# Patient Record
Sex: FEMALE | Race: WHITE | Employment: FULL TIME | URBAN - METROPOLITAN AREA
[De-identification: names, ages, dates, MRNs, and addresses within clinical notes are randomized per-mention and may not be internally consistent; named-entity substitution may affect disease eponyms.]

---

## 2017-11-19 ENCOUNTER — HOSPITAL ENCOUNTER (OUTPATIENT)
Age: 57
Discharge: HOME | End: 2017-11-19
Attending: FAMILY MEDICINE
Payer: COMMERCIAL

## 2017-11-19 VITALS
WEIGHT: 185 LBS | HEART RATE: 105 BPM | TEMPERATURE: 99 F | SYSTOLIC BLOOD PRESSURE: 119 MMHG | OXYGEN SATURATION: 98 % | DIASTOLIC BLOOD PRESSURE: 75 MMHG | RESPIRATION RATE: 16 BRPM

## 2017-11-19 DIAGNOSIS — J01.00 ACUTE NON-RECURRENT MAXILLARY SINUSITIS: Primary | ICD-10-CM

## 2017-11-19 PROCEDURE — 99213 OFFICE O/P EST LOW 20 MIN: CPT

## 2017-11-19 PROCEDURE — 99202 OFFICE O/P NEW SF 15 MIN: CPT

## 2017-11-19 RX ORDER — AMOXICILLIN 875 MG/1
875 TABLET, COATED ORAL 2 TIMES DAILY
Qty: 20 TABLET | Refills: 0 | Status: SHIPPED | OUTPATIENT
Start: 2017-11-19 | End: 2017-11-29

## 2017-11-19 NOTE — ED PROVIDER NOTES
Patient presents with:  Cough/URI      HPI:     Rigo Oneil is a 59year old female who presents with for chief complaint of sinus congestion, sinus pain, purulent nasal secretions  X 1 week.     The patient denies complaints of fevers, chills, headache, lesions      Assessment/Plan:     Diagnosis:    ICD-10-CM    1. Acute non-recurrent maxillary sinusitis J01.00        See antibiotic below.   OTC Advil Cold and Sinus as needed symptoms      Orders Placed This Encounter      amoxicillin 875 MG Oral Tab

## 2018-01-22 ENCOUNTER — OFFICE VISIT (OUTPATIENT)
Dept: INTERNAL MEDICINE CLINIC | Facility: CLINIC | Age: 58
End: 2018-01-22

## 2018-01-22 VITALS
DIASTOLIC BLOOD PRESSURE: 78 MMHG | SYSTOLIC BLOOD PRESSURE: 122 MMHG | OXYGEN SATURATION: 96 % | TEMPERATURE: 99 F | WEIGHT: 195 LBS | BODY MASS INDEX: 32.49 KG/M2 | HEIGHT: 65 IN | HEART RATE: 111 BPM

## 2018-01-22 DIAGNOSIS — Z00.00 ANNUAL PHYSICAL EXAM: Primary | ICD-10-CM

## 2018-01-22 DIAGNOSIS — E66.3 OVERWEIGHT: ICD-10-CM

## 2018-01-22 DIAGNOSIS — R53.83 FATIGUE, UNSPECIFIED TYPE: ICD-10-CM

## 2018-01-22 DIAGNOSIS — Z12.31 ENCOUNTER FOR SCREENING MAMMOGRAM FOR BREAST CANCER: ICD-10-CM

## 2018-01-22 DIAGNOSIS — I83.90 VARICOSE VEIN OF LEG: ICD-10-CM

## 2018-01-22 PROCEDURE — 99386 PREV VISIT NEW AGE 40-64: CPT | Performed by: INTERNAL MEDICINE

## 2018-01-22 NOTE — PROGRESS NOTES
Mae Borja is a 59year old female. Patient presents with:  Consult: New Patient - Last pap 1 year ago  Physical: Annual       HPI:   Mae Borja is a 59year old female who presents for a complete physical exam.     PMH: none, overweight  Is planning Left arm, Patient Position: Sitting, Cuff Size: adult)   Pulse 111   Temp 98.9 °F (37.2 °C) (Oral)   Ht 5' 5\" (1.651 m)   Wt 195 lb (88.5 kg)   SpO2 96%   BMI 32.45 kg/m²     GENERAL: well developed, well nourished, in no apparent distress  HEENT: normal

## 2018-02-26 ENCOUNTER — TELEPHONE (OUTPATIENT)
Dept: INTERNAL MEDICINE CLINIC | Facility: CLINIC | Age: 58
End: 2018-02-26

## 2018-02-26 NOTE — TELEPHONE ENCOUNTER
Pt would like to have a prescription for cold sore. She noticed one coming up today.      Tasked high to Muzy

## 2018-03-01 RX ORDER — ACYCLOVIR 50 MG/G
1 OINTMENT TOPICAL 4 TIMES DAILY
Qty: 15 G | Refills: 0 | Status: SHIPPED | OUTPATIENT
Start: 2018-03-01 | End: 2018-03-06

## 2018-03-01 NOTE — TELEPHONE ENCOUNTER
Pt is requesting a cream for her cold sore, the pill helped but it is not gone  Pt is leaving town this weekend and would like it cleared up before then  Please send [address], please call pt [phone]    Tasked to nursing

## 2019-08-13 ENCOUNTER — TELEPHONE (OUTPATIENT)
Dept: INTERNAL MEDICINE CLINIC | Facility: CLINIC | Age: 59
End: 2019-08-13

## 2019-08-13 DIAGNOSIS — R73.01 ABNORMAL FASTING GLUCOSE: Primary | ICD-10-CM

## 2019-08-13 NOTE — TELEPHONE ENCOUNTER
Please notify patient that I received her lab results (these were faxed). Overall her kidney, liver and blood counts look good. Her cholesterol and fasting sugar are elevated. Her fasting sugar is in the diabetes range.  I would like her to do one additio

## 2019-08-19 NOTE — TELEPHONE ENCOUNTER
I spoke with patient and relayed Dr. Ely Echevarria message. She verbalized understanding. Patient provided with the lab hours at [facility] for Monday through Saturday. Patient scheduled her physical with Dr. Zapien on September 16.

## 2019-08-24 ENCOUNTER — APPOINTMENT (OUTPATIENT)
Dept: LAB | Age: 59
End: 2019-08-24
Attending: INTERNAL MEDICINE
Payer: COMMERCIAL

## 2019-08-24 DIAGNOSIS — R73.01 ABNORMAL FASTING GLUCOSE: ICD-10-CM

## 2019-08-24 LAB
EST. AVERAGE GLUCOSE BLD GHB EST-MCNC: 148 MG/DL (ref 68–126)
HBA1C MFR BLD HPLC: 6.8 % (ref ?–5.7)

## 2019-08-24 PROCEDURE — 36415 COLL VENOUS BLD VENIPUNCTURE: CPT

## 2019-08-24 PROCEDURE — 83036 HEMOGLOBIN GLYCOSYLATED A1C: CPT

## 2019-09-16 ENCOUNTER — OFFICE VISIT (OUTPATIENT)
Dept: INTERNAL MEDICINE CLINIC | Facility: CLINIC | Age: 59
End: 2019-09-16
Payer: COMMERCIAL

## 2019-09-16 VITALS
WEIGHT: 195.81 LBS | DIASTOLIC BLOOD PRESSURE: 80 MMHG | HEART RATE: 92 BPM | HEIGHT: 65 IN | OXYGEN SATURATION: 99 % | TEMPERATURE: 99 F | SYSTOLIC BLOOD PRESSURE: 122 MMHG | BODY MASS INDEX: 32.62 KG/M2

## 2019-09-16 DIAGNOSIS — E78.5 DYSLIPIDEMIA: ICD-10-CM

## 2019-09-16 DIAGNOSIS — Z00.00 ANNUAL PHYSICAL EXAM: Primary | ICD-10-CM

## 2019-09-16 DIAGNOSIS — I83.90 VARICOSE VEINS OF LOWER EXTREMITY, UNSPECIFIED LATERALITY, UNSPECIFIED WHETHER COMPLICATED: ICD-10-CM

## 2019-09-16 DIAGNOSIS — E11.9 TYPE 2 DIABETES MELLITUS WITHOUT COMPLICATION, WITHOUT LONG-TERM CURRENT USE OF INSULIN (HCC): ICD-10-CM

## 2019-09-16 PROCEDURE — 99396 PREV VISIT EST AGE 40-64: CPT | Performed by: INTERNAL MEDICINE

## 2019-09-16 RX ORDER — METFORMIN HYDROCHLORIDE 500 MG/1
500 TABLET, EXTENDED RELEASE ORAL DAILY
Qty: 30 TABLET | Refills: 3 | Status: SHIPPED | OUTPATIENT
Start: 2019-09-16 | End: 2020-05-11

## 2019-09-16 NOTE — PROGRESS NOTES
Federico Quevedo is a 62year old female. Patient presents with:  Physical      HPI:   Federico Quevedo is a 62year old female who presents for a complete physical exam.     PMH: none, overweight    PSH: , breast reduction, tonsillectomy.      Gyne: csec oz (88.8 kg)   SpO2 99%   BMI 32.58 kg/m²     GENERAL: well developed, well nourished, in no apparent distress  HEENT: normal oropharynx, normal TM's  EYES: PERRLA, EOMI, conjunctivae are pink  NECK: supple, no cervical or supraclavicular LAD, no carotic b

## 2020-05-07 ENCOUNTER — HOSPITAL ENCOUNTER (OUTPATIENT)
Dept: MAMMOGRAPHY | Age: 60
Discharge: HOME | End: 2020-05-07
Attending: INTERNAL MEDICINE
Payer: COMMERCIAL

## 2020-05-07 ENCOUNTER — APPOINTMENT (OUTPATIENT)
Dept: LAB | Age: 60
End: 2020-05-07
Attending: INTERNAL MEDICINE
Payer: COMMERCIAL

## 2020-05-07 DIAGNOSIS — E11.9 TYPE 2 DIABETES MELLITUS WITHOUT COMPLICATION, WITHOUT LONG-TERM CURRENT USE OF INSULIN (HCC): ICD-10-CM

## 2020-05-07 DIAGNOSIS — Z12.31 ENCOUNTER FOR SCREENING MAMMOGRAM FOR MALIGNANT NEOPLASM OF BREAST: ICD-10-CM

## 2020-05-07 PROCEDURE — 77063 BREAST TOMOSYNTHESIS BI: CPT | Performed by: INTERNAL MEDICINE

## 2020-05-07 PROCEDURE — 83036 HEMOGLOBIN GLYCOSYLATED A1C: CPT

## 2020-05-07 PROCEDURE — 36415 COLL VENOUS BLD VENIPUNCTURE: CPT

## 2020-05-07 PROCEDURE — 77067 SCR MAMMO BI INCL CAD: CPT | Performed by: INTERNAL MEDICINE

## 2020-05-11 ENCOUNTER — TELEPHONE (OUTPATIENT)
Dept: INTERNAL MEDICINE CLINIC | Facility: CLINIC | Age: 60
End: 2020-05-11

## 2020-05-11 DIAGNOSIS — E11.9 TYPE 2 DIABETES MELLITUS WITHOUT COMPLICATION, WITHOUT LONG-TERM CURRENT USE OF INSULIN (HCC): ICD-10-CM

## 2020-05-11 DIAGNOSIS — Z00.00 ANNUAL PHYSICAL EXAM: Primary | ICD-10-CM

## 2020-05-11 RX ORDER — METFORMIN HYDROCHLORIDE 500 MG/1
500 TABLET, EXTENDED RELEASE ORAL 2 TIMES DAILY WITH MEALS
Qty: 180 TABLET | Refills: 1 | Status: SHIPPED | OUTPATIENT
Start: 2020-05-11

## 2020-05-11 NOTE — TELEPHONE ENCOUNTER
Please notify patient that her DM is still not improved--I would like her to increase her metformin to 500mg twice daily; I would like to see her back in August for her physical (I have also put in for fasting blood tests that she can do 1 week prior to he

## 2020-07-09 ENCOUNTER — TELEPHONE (OUTPATIENT)
Dept: GASTROENTEROLOGY | Facility: CLINIC | Age: 60
End: 2020-07-09

## 2020-07-09 ENCOUNTER — OFFICE VISIT (OUTPATIENT)
Dept: GASTROENTEROLOGY | Facility: CLINIC | Age: 60
End: 2020-07-09
Payer: COMMERCIAL

## 2020-07-09 VITALS
HEIGHT: 65 IN | HEART RATE: 88 BPM | DIASTOLIC BLOOD PRESSURE: 74 MMHG | WEIGHT: 186 LBS | BODY MASS INDEX: 30.99 KG/M2 | SYSTOLIC BLOOD PRESSURE: 115 MMHG

## 2020-07-09 DIAGNOSIS — Z12.11 SCREENING FOR COLORECTAL CANCER: Primary | ICD-10-CM

## 2020-07-09 DIAGNOSIS — Z12.11 SCREEN FOR COLON CANCER: Primary | ICD-10-CM

## 2020-07-09 DIAGNOSIS — Z12.12 SCREENING FOR COLORECTAL CANCER: Primary | ICD-10-CM

## 2020-07-09 PROCEDURE — S0285 CNSLT BEFORE SCREEN COLONOSC: HCPCS | Performed by: INTERNAL MEDICINE

## 2020-07-09 NOTE — PATIENT INSTRUCTIONS
Schedule colonoscopy following a split dose Suprep and either IV sedation or monitored anesthesia care per scheduling.

## 2020-07-09 NOTE — TELEPHONE ENCOUNTER
Scheduled for:  Colonoscopy 37957  Provider Name:  Dr. Elissa Vincent  Date:  8/11/2020  Location:  [facility]  Sedation:  MAC  Time:  11:00 am, arrival 10:00 am  Prep:  Suprep  Meds/Allergies Reconciled?:  Physician reviewed  Diagnosis with codes:  Screening for c

## 2020-07-09 NOTE — PROGRESS NOTES
HPI:    Patient ID: Mary Smoker is a 56year old female. HPI  The patient is seen at the request of Dr. Iona Aase for colorectal cancer screening. She has not had a prior colonoscopy. The patient feels well. Her appetite is good.   She denies nausea, v hepatosplenomegaly. There is no tenderness. There is no rebound and no guarding. Musculoskeletal:         General: No edema. Lymphadenopathy:     She has no cervical adenopathy. Neurological: She is alert and oriented to person, place, and time.    Ps

## 2020-07-21 ENCOUNTER — ANESTHESIA (OUTPATIENT)
Dept: ENDOSCOPY | Facility: HOSPITAL | Age: 60
End: 2020-07-21
Payer: COMMERCIAL

## 2020-07-21 ENCOUNTER — ANESTHESIA EVENT (OUTPATIENT)
Dept: ENDOSCOPY | Facility: HOSPITAL | Age: 60
End: 2020-07-21
Payer: COMMERCIAL

## 2020-07-21 ENCOUNTER — HOSPITAL ENCOUNTER (OUTPATIENT)
Facility: HOSPITAL | Age: 60
Setting detail: HOSPITAL OUTPATIENT SURGERY
Discharge: HOME | End: 2020-07-21
Attending: INTERNAL MEDICINE | Admitting: INTERNAL MEDICINE
Payer: COMMERCIAL

## 2020-07-21 VITALS
BODY MASS INDEX: 30.82 KG/M2 | HEART RATE: 58 BPM | SYSTOLIC BLOOD PRESSURE: 123 MMHG | RESPIRATION RATE: 20 BRPM | HEIGHT: 65 IN | TEMPERATURE: 98 F | OXYGEN SATURATION: 97 % | DIASTOLIC BLOOD PRESSURE: 80 MMHG | WEIGHT: 185 LBS

## 2020-07-21 DIAGNOSIS — Z12.11 SCREEN FOR COLON CANCER: ICD-10-CM

## 2020-07-21 LAB
GLUCOSE BLDC GLUCOMTR-MCNC: 98 MG/DL (ref 70–99)
SARS-COV-2 RNA RESP QL NAA+PROBE: NOT DETECTED

## 2020-07-21 PROCEDURE — 0DBN8ZX EXCISION OF SIGMOID COLON, VIA NATURAL OR ARTIFICIAL OPENING ENDOSCOPIC, DIAGNOSTIC: ICD-10-PCS | Performed by: INTERNAL MEDICINE

## 2020-07-21 PROCEDURE — 45380 COLONOSCOPY AND BIOPSY: CPT | Performed by: INTERNAL MEDICINE

## 2020-07-21 PROCEDURE — 45385 COLONOSCOPY W/LESION REMOVAL: CPT | Performed by: INTERNAL MEDICINE

## 2020-07-21 PROCEDURE — 0DBP8ZX EXCISION OF RECTUM, VIA NATURAL OR ARTIFICIAL OPENING ENDOSCOPIC, DIAGNOSTIC: ICD-10-PCS | Performed by: INTERNAL MEDICINE

## 2020-07-21 PROCEDURE — 0DBH8ZX EXCISION OF CECUM, VIA NATURAL OR ARTIFICIAL OPENING ENDOSCOPIC, DIAGNOSTIC: ICD-10-PCS | Performed by: INTERNAL MEDICINE

## 2020-07-21 RX ORDER — LIDOCAINE HYDROCHLORIDE 10 MG/ML
INJECTION, SOLUTION EPIDURAL; INFILTRATION; INTRACAUDAL; PERINEURAL AS NEEDED
Status: DISCONTINUED | OUTPATIENT
Start: 2020-07-21 | End: 2020-07-21 | Stop reason: SURG

## 2020-07-21 RX ORDER — SODIUM CHLORIDE, SODIUM LACTATE, POTASSIUM CHLORIDE, CALCIUM CHLORIDE 600; 310; 30; 20 MG/100ML; MG/100ML; MG/100ML; MG/100ML
INJECTION, SOLUTION INTRAVENOUS CONTINUOUS
Status: DISCONTINUED | OUTPATIENT
Start: 2020-07-21 | End: 2020-07-21

## 2020-07-21 RX ADMIN — SODIUM CHLORIDE, SODIUM LACTATE, POTASSIUM CHLORIDE, CALCIUM CHLORIDE: 600; 310; 30; 20 INJECTION, SOLUTION INTRAVENOUS at 12:20:00

## 2020-07-21 RX ADMIN — SODIUM CHLORIDE, SODIUM LACTATE, POTASSIUM CHLORIDE, CALCIUM CHLORIDE: 600; 310; 30; 20 INJECTION, SOLUTION INTRAVENOUS at 11:49:00

## 2020-07-21 RX ADMIN — LIDOCAINE HYDROCHLORIDE 40 MG: 10 INJECTION, SOLUTION EPIDURAL; INFILTRATION; INTRACAUDAL; PERINEURAL at 11:53:00

## 2020-07-21 NOTE — H&P
History & Physical Examination    Patient Name: Cherise Smith  MRN: [id number]  CSN: [id number]  YOB: 1960    Diagnosis: Colorectal cancer screening      Na Sulfate-K Sulfate-Mg Sulf (SUPREP BOWEL PREP KIT) 17.5-3.13-1.6 GM/177ML Oral Solution

## 2020-07-21 NOTE — ANESTHESIA PREPROCEDURE EVALUATION
Anesthesia PreOp Note    HPI:     Senait Hampton is a 56year old female who presents for preoperative consultation requested by: John Lopez MD    Date of Surgery: 7/21/2020    Procedure(s):  COLONOSCOPY  Indication: Screen for colon cancer    Re Non-medical: Not on file    Tobacco Use      Smoking status: Never Smoker      Smokeless tobacco: Never Used    Substance and Sexual Activity      Alcohol use: Yes        Comment: Social      Drug use: No      Sexual activity: Not on file    Lifestyl Abdomen: soft.                Anesthesia Plan:   ASA:  2  Plan:   MAC  Informed Consent Plan and Risks Discussed With:  Patient  Use of Blood Products Discussed With:  Patient  Blood Product Use Consented    Discussed plan with:  CRNA      I have informed J

## 2020-07-21 NOTE — ANESTHESIA POSTPROCEDURE EVALUATION
Patient: Mary Styles    Procedure Summary     Date:  07/21/20 Room / Location:  [facility] ENDOSCOPY 04 / [facility]    Anesthesia Start:  0866 Anesthesia Stop:  6814    Procedure:  COLONOSCOPY (N/A ) Diagnosis:       Screen for colon cancer      (colon poly

## 2020-07-21 NOTE — OPERATIVE REPORT
[facility] Endoscopy Report      Date of Procedure:  07/21/20      Preoperative Diagnosis:  Colorectal cancer screening      Postoperative Diagnosis:  1. Colon polyps  2.   Sigmoid colon diverticulosis      Procedure:    Colonoscopy with po of inflammation seen. Retroflexion in the rectum revealed no abnormalities. The procedure was well tolerated without immediate complication. Impression:  1. Colon polyps as described above  2.   Sigmoid colon diverticulosis, currently uncomplicated

## 2020-07-22 ENCOUNTER — TELEPHONE (OUTPATIENT)
Dept: GASTROENTEROLOGY | Facility: CLINIC | Age: 60
End: 2020-07-22

## 2020-07-22 NOTE — TELEPHONE ENCOUNTER
Entered into Epic:     Recall for __CLN__per _Stathopoulos____in __10 years____  Last done: 7-  Next due: 7-   updated

## 2020-07-22 NOTE — TELEPHONE ENCOUNTER
----- Message from Pramod Guzman MD sent at 7/22/2020  5:42 PM CDT -----  Please see the following My Chart message sent to the patient:    Hi Ms. Mayra Reyes left a message on your voicemail.     Your colonoscopy revealed #3 polyps which were remov

## 2020-08-03 ENCOUNTER — TELEPHONE (OUTPATIENT)
Dept: GASTROENTEROLOGY | Facility: CLINIC | Age: 60
End: 2020-08-03

## 2020-08-03 NOTE — TELEPHONE ENCOUNTER
Per [facility] Endo/RN this patient was scheduled on 8/11/20 but showed up on 7/21/20 all prep and ready to go since she thought her procedure was on 7/21/20. Since Endo had an opening Dr. Lee Gleason preformed her procedure.     Rescheduled for:  Colonoscop

## 2020-11-09 ENCOUNTER — TELEPHONE (OUTPATIENT)
Dept: INTERNAL MEDICINE CLINIC | Facility: CLINIC | Age: 60
End: 2020-11-09

## 2020-11-09 NOTE — TELEPHONE ENCOUNTER
Per chart review, contacted by Dr. Little Mccain recommended physical examination from August 2020. Advised that she make an appointment in my clinic for annual physical nomination.     Reviewed blood work from 10/31/2020  –Fasting glucose 126, otherwise electrolyt

## 2020-11-17 NOTE — TELEPHONE ENCOUNTER
Patient called back. Relayed message from Dr. Heide Pardo. Patient verbalized understanding of information/instructions and agreement with plan.  Call transferred to  to schedule annual PE.

## 2020-11-21 NOTE — H&P
Mayra Schaefer is a 56year old female. HPI:   Patient presents with:  Physical      Ms. Breana Yanez is a 56year old female coming in for annual physical examination. Has been eating better. Did well in [place] from the last visit.  Feels like she fell off a l Alcohol use: Yes      Comment: Social    Drug use: No       Medications (Active prior to today's visit):  Current Outpatient Medications   Medication Sig Dispense Refill   • Meloxicam 15 MG Oral Tab Take 1 tablet by mouth daily as needed.      • metFORMIN H b/l  HENT: NCAT, Moist mucous membranes, Oropharynx without erythema or exudates  Neck: No JVD, no thyromegaly  Cardiovascular: S1, S2, no S3, no S4, Regular rate and rhythm, No murmurs/gallops/rubs.    Vascular: Equal pulses 2+; +varicose veins bilaterlaly polyp:  · Hyperplastic polyp.          ASSESSMENT/PLAN:       Ms. Bryce Mina is a 56year old female coming in for annual physical examination.     varicose veins  Wear 15-20mmhg compression during daytime  - Referral to Dr. Misty Wise, vascular surgery, for e hold off today  Zoster (60+): patient will call insurance company to confirm coverage.   HPV (19-26): Not indicated  Pneumococcal: Not indicated    Return to clinic in 6-12 mo     Elan Blackmon, 11/23/20, 1:41 PM

## 2020-11-21 NOTE — PATIENT INSTRUCTIONS
- You were seen in clinic for regular annual check-up. We have ordered repeat hemoglobin A1c, BMP, and lipid panel to be completed when you are fasting. Okay to drink water.   Lets repeat this blood work in 3 months.  -We calculated her risk for 4.5% card

## 2020-11-23 ENCOUNTER — OFFICE VISIT (OUTPATIENT)
Dept: INTERNAL MEDICINE CLINIC | Facility: CLINIC | Age: 60
End: 2020-11-23
Payer: COMMERCIAL

## 2020-11-23 VITALS
TEMPERATURE: 99 F | DIASTOLIC BLOOD PRESSURE: 82 MMHG | HEART RATE: 90 BPM | HEIGHT: 65 IN | WEIGHT: 186 LBS | OXYGEN SATURATION: 97 % | BODY MASS INDEX: 30.99 KG/M2 | SYSTOLIC BLOOD PRESSURE: 136 MMHG

## 2020-11-23 DIAGNOSIS — Z13.1 SCREENING FOR DIABETES MELLITUS: ICD-10-CM

## 2020-11-23 DIAGNOSIS — E78.5 DYSLIPIDEMIA: ICD-10-CM

## 2020-11-23 DIAGNOSIS — Z13.220 SCREENING FOR LIPOID DISORDERS: ICD-10-CM

## 2020-11-23 DIAGNOSIS — Z00.00 ANNUAL PHYSICAL EXAM: Primary | ICD-10-CM

## 2020-11-23 DIAGNOSIS — R73.03 PREDIABETES: ICD-10-CM

## 2020-11-23 DIAGNOSIS — I83.893 VARICOSE VEINS OF BILATERAL LOWER EXTREMITIES WITH OTHER COMPLICATIONS: ICD-10-CM

## 2020-11-23 PROCEDURE — 3079F DIAST BP 80-89 MM HG: CPT | Performed by: INTERNAL MEDICINE

## 2020-11-23 PROCEDURE — 99396 PREV VISIT EST AGE 40-64: CPT | Performed by: INTERNAL MEDICINE

## 2020-11-23 PROCEDURE — 99072 ADDL SUPL MATRL&STAF TM PHE: CPT | Performed by: INTERNAL MEDICINE

## 2020-11-23 PROCEDURE — 3008F BODY MASS INDEX DOCD: CPT | Performed by: INTERNAL MEDICINE

## 2020-11-23 PROCEDURE — 3075F SYST BP GE 130 - 139MM HG: CPT | Performed by: INTERNAL MEDICINE

## 2020-11-23 RX ORDER — MELOXICAM 15 MG/1
1 TABLET ORAL DAILY PRN
COMMUNITY
Start: 2020-11-02

## 2021-01-27 ENCOUNTER — TELEPHONE (OUTPATIENT)
Dept: INTERNAL MEDICINE CLINIC | Facility: CLINIC | Age: 61
End: 2021-01-27

## 2021-01-27 RX ORDER — VALACYCLOVIR HYDROCHLORIDE 1 G/1
2 TABLET, FILM COATED ORAL EVERY 12 HOURS SCHEDULED
Qty: 12 TABLET | Refills: 3 | Status: SHIPPED | OUTPATIENT
Start: 2021-01-27 | End: 2021-02-08

## 2021-01-27 NOTE — TELEPHONE ENCOUNTER
Please kindly notify the patient that I prescribed her:    Valacyclovir take 2 tablets 2000 mg total every 12 hours for 1 day as needed. Prescribed 12 tablets with 3 refills if a recurrent issue.

## 2021-01-27 NOTE — TELEPHONE ENCOUNTER
Perez's LOV 11/23/21 with Dr. Peyton Acuña, Routed to MD to advise in Dr. Vidya dye. Looks like she has previously been treated with ValACYclovir HCl 1 G Oral Tabs & Acyclovir 5 % External Ointment.

## 2021-01-27 NOTE — TELEPHONE ENCOUNTER
Pt. Has a cold sore and she is asking if someone can prescribe her the medication that Dr. Iona Aase has given her in the past?  Pt. Uses [facility] in [place].   Pt. Can be reached at [phone].

## 2021-11-10 ENCOUNTER — TELEPHONE (OUTPATIENT)
Dept: INTERNAL MEDICINE CLINIC | Facility: CLINIC | Age: 61
End: 2021-11-10

## 2021-11-10 ENCOUNTER — MED REC SCAN ONLY (OUTPATIENT)
Dept: INTERNAL MEDICINE CLINIC | Facility: CLINIC | Age: 61
End: 2021-11-10

## 2021-11-10 NOTE — TELEPHONE ENCOUNTER
Please notify pt that I received lab results--she is overdue for a physical; please have her schedule and we can go through these then

## 2021-11-10 NOTE — TELEPHONE ENCOUNTER
To FD_---    Please call pt to schedule physical. Please advise that lab work can be discussed at time of visit and were received.

## 2022-07-28 ENCOUNTER — TELEPHONE (OUTPATIENT)
Dept: INTERNAL MEDICINE CLINIC | Facility: CLINIC | Age: 62
End: 2022-07-28

## 2022-07-28 NOTE — TELEPHONE ENCOUNTER
Pt is calling and states she would like to speak to Dr. Fiona Joseph directly. Patient is having a wellness exam at work in the next couple of weeks would like to talk to the doctor about what tests she should have. Please call and advise. Patient is aware that Dr. Fiona Joseph will not be in the office until next week.

## 2022-08-02 NOTE — TELEPHONE ENCOUNTER
Called pt and left message--I haven't seen pt in a while; Dr. Gonzalez Notice last saw 11/2020, so asked pt to come in for physical and I can give orders at that time

## 2022-08-17 ENCOUNTER — TELEPHONE (OUTPATIENT)
Dept: INTERNAL MEDICINE CLINIC | Facility: CLINIC | Age: 62
End: 2022-08-17

## 2022-08-17 NOTE — TELEPHONE ENCOUNTER
Please call patient to set up annual physical exam--I received her wellness blood work and would like to go over results with her at that time too--we are also behind on her screening tests.    I am very concerned about her blood work and do think she should come in soon (within the next 1-2 weeks)      Note to self:    Fasting glucose 137

## 2022-09-07 ENCOUNTER — OFFICE VISIT (OUTPATIENT)
Dept: INTERNAL MEDICINE CLINIC | Facility: CLINIC | Age: 62
End: 2022-09-07
Payer: COMMERCIAL

## 2022-09-07 VITALS
BODY MASS INDEX: 30.99 KG/M2 | HEIGHT: 65 IN | OXYGEN SATURATION: 97 % | HEART RATE: 78 BPM | WEIGHT: 186 LBS | SYSTOLIC BLOOD PRESSURE: 122 MMHG | DIASTOLIC BLOOD PRESSURE: 76 MMHG | TEMPERATURE: 98 F

## 2022-09-07 DIAGNOSIS — R92.2 DENSE BREAST: ICD-10-CM

## 2022-09-07 DIAGNOSIS — Z12.4 SCREENING FOR CERVICAL CANCER: ICD-10-CM

## 2022-09-07 DIAGNOSIS — E78.5 DYSLIPIDEMIA: ICD-10-CM

## 2022-09-07 DIAGNOSIS — Z78.0 POSTMENOPAUSAL: Primary | ICD-10-CM

## 2022-09-07 DIAGNOSIS — Z12.31 ENCOUNTER FOR SCREENING MAMMOGRAM FOR MALIGNANT NEOPLASM OF BREAST: ICD-10-CM

## 2022-09-07 DIAGNOSIS — E11.9 TYPE 2 DIABETES MELLITUS WITHOUT COMPLICATION, WITHOUT LONG-TERM CURRENT USE OF INSULIN (HCC): ICD-10-CM

## 2022-09-07 PROCEDURE — 3008F BODY MASS INDEX DOCD: CPT | Performed by: INTERNAL MEDICINE

## 2022-09-07 PROCEDURE — 3074F SYST BP LT 130 MM HG: CPT | Performed by: INTERNAL MEDICINE

## 2022-09-07 PROCEDURE — 3078F DIAST BP <80 MM HG: CPT | Performed by: INTERNAL MEDICINE

## 2022-09-07 PROCEDURE — 99396 PREV VISIT EST AGE 40-64: CPT | Performed by: INTERNAL MEDICINE

## 2022-09-07 RX ORDER — METFORMIN HYDROCHLORIDE 500 MG/1
500 TABLET, EXTENDED RELEASE ORAL 2 TIMES DAILY WITH MEALS
Qty: 180 TABLET | Refills: 0 | Status: SHIPPED | OUTPATIENT
Start: 2022-09-07

## 2022-09-13 ENCOUNTER — ORDER TRANSCRIPTION (OUTPATIENT)
Dept: ADMINISTRATIVE | Facility: HOSPITAL | Age: 62
End: 2022-09-13

## 2022-09-13 DIAGNOSIS — Z13.6 SCREENING FOR CARDIOVASCULAR CONDITION: Primary | ICD-10-CM

## 2022-09-20 LAB — HPV I/H RISK 1 DNA SPEC QL NAA+PROBE: NEGATIVE

## 2022-09-21 LAB — LAST PAP RESULT: NORMAL

## 2022-10-05 ENCOUNTER — HOSPITAL ENCOUNTER (OUTPATIENT)
Dept: MAMMOGRAPHY | Age: 62
Discharge: HOME | End: 2022-10-05
Attending: INTERNAL MEDICINE
Payer: COMMERCIAL

## 2022-10-05 ENCOUNTER — HOSPITAL ENCOUNTER (OUTPATIENT)
Age: 62
Discharge: HOME | End: 2022-10-05
Payer: COMMERCIAL

## 2022-10-05 ENCOUNTER — HOSPITAL ENCOUNTER (OUTPATIENT)
Dept: BONE DENSITY | Age: 62
Discharge: HOME | End: 2022-10-05
Attending: INTERNAL MEDICINE
Payer: COMMERCIAL

## 2022-10-05 VITALS
OXYGEN SATURATION: 99 % | BODY MASS INDEX: 30.73 KG/M2 | WEIGHT: 180 LBS | TEMPERATURE: 98 F | HEART RATE: 96 BPM | DIASTOLIC BLOOD PRESSURE: 87 MMHG | HEIGHT: 64 IN | RESPIRATION RATE: 18 BRPM | SYSTOLIC BLOOD PRESSURE: 149 MMHG

## 2022-10-05 DIAGNOSIS — J06.9 VIRAL UPPER RESPIRATORY TRACT INFECTION: Primary | ICD-10-CM

## 2022-10-05 DIAGNOSIS — Z12.31 ENCOUNTER FOR SCREENING MAMMOGRAM FOR MALIGNANT NEOPLASM OF BREAST: ICD-10-CM

## 2022-10-05 DIAGNOSIS — Z78.0 POSTMENOPAUSAL: ICD-10-CM

## 2022-10-05 DIAGNOSIS — Z20.822 ENCOUNTER FOR LABORATORY TESTING FOR COVID-19 VIRUS: ICD-10-CM

## 2022-10-05 DIAGNOSIS — Z20.822 LAB TEST NEGATIVE FOR COVID-19 VIRUS: ICD-10-CM

## 2022-10-05 DIAGNOSIS — R92.2 DENSE BREAST: ICD-10-CM

## 2022-10-05 LAB — SARS-COV-2 RNA RESP QL NAA+PROBE: NOT DETECTED

## 2022-10-05 PROCEDURE — 99203 OFFICE O/P NEW LOW 30 MIN: CPT | Performed by: NURSE PRACTITIONER

## 2022-10-05 PROCEDURE — U0002 COVID-19 LAB TEST NON-CDC: HCPCS | Performed by: NURSE PRACTITIONER

## 2022-10-05 PROCEDURE — 77063 BREAST TOMOSYNTHESIS BI: CPT | Performed by: INTERNAL MEDICINE

## 2022-10-05 PROCEDURE — 77067 SCR MAMMO BI INCL CAD: CPT | Performed by: INTERNAL MEDICINE

## 2022-10-05 PROCEDURE — 77080 DXA BONE DENSITY AXIAL: CPT | Performed by: INTERNAL MEDICINE

## 2022-10-10 ENCOUNTER — PATIENT MESSAGE (OUTPATIENT)
Dept: INTERNAL MEDICINE CLINIC | Facility: CLINIC | Age: 62
End: 2022-10-10

## 2022-11-08 ENCOUNTER — HOSPITAL ENCOUNTER (OUTPATIENT)
Dept: CT IMAGING | Age: 62
Discharge: HOME | End: 2022-11-08
Attending: INTERNAL MEDICINE

## 2022-11-08 DIAGNOSIS — Z13.6 SCREENING FOR CARDIOVASCULAR CONDITION: ICD-10-CM

## 2022-12-07 RX ORDER — METFORMIN HYDROCHLORIDE 500 MG/1
500 TABLET, EXTENDED RELEASE ORAL 2 TIMES DAILY WITH MEALS
Qty: 180 TABLET | Refills: 3 | Status: SHIPPED | OUTPATIENT
Start: 2022-12-07

## 2023-10-30 ENCOUNTER — OFFICE VISIT (OUTPATIENT)
Dept: INTERNAL MEDICINE CLINIC | Facility: CLINIC | Age: 63
End: 2023-10-30

## 2023-10-30 VITALS
WEIGHT: 187 LBS | DIASTOLIC BLOOD PRESSURE: 78 MMHG | TEMPERATURE: 98 F | SYSTOLIC BLOOD PRESSURE: 140 MMHG | OXYGEN SATURATION: 98 % | HEART RATE: 90 BPM | HEIGHT: 64 IN | BODY MASS INDEX: 31.92 KG/M2

## 2023-10-30 DIAGNOSIS — R92.2 DENSE BREAST: ICD-10-CM

## 2023-10-30 DIAGNOSIS — Z00.00 ENCOUNTER FOR ANNUAL HEALTH EXAMINATION: Primary | ICD-10-CM

## 2023-10-30 DIAGNOSIS — E11.9 TYPE 2 DIABETES MELLITUS WITHOUT COMPLICATION, WITHOUT LONG-TERM CURRENT USE OF INSULIN (HCC): ICD-10-CM

## 2023-10-30 DIAGNOSIS — Z12.31 ENCOUNTER FOR SCREENING MAMMOGRAM FOR MALIGNANT NEOPLASM OF BREAST: ICD-10-CM

## 2023-10-30 DIAGNOSIS — E78.5 DYSLIPIDEMIA: ICD-10-CM

## 2023-10-30 DIAGNOSIS — R92.30 DENSE BREAST: ICD-10-CM

## 2023-10-30 DIAGNOSIS — Z23 NEED FOR VACCINATION: ICD-10-CM

## 2023-10-30 LAB
ATRIAL RATE: 82 BPM
P AXIS: 46 DEGREES
P-R INTERVAL: 154 MS
Q-T INTERVAL: 368 MS
QRS DURATION: 76 MS
QTC CALCULATION (BEZET): 429 MS
R AXIS: 4 DEGREES
T AXIS: 25 DEGREES
VENTRICULAR RATE: 82 BPM

## 2023-10-30 RX ORDER — ATORVASTATIN CALCIUM 10 MG/1
10 TABLET, FILM COATED ORAL DAILY
Qty: 90 TABLET | Refills: 3 | Status: SHIPPED | OUTPATIENT
Start: 2023-10-30 | End: 2024-10-24

## 2023-10-30 RX ORDER — METFORMIN HYDROCHLORIDE 500 MG/1
500 TABLET, EXTENDED RELEASE ORAL 2 TIMES DAILY WITH MEALS
Qty: 180 TABLET | Refills: 3 | Status: SHIPPED | OUTPATIENT
Start: 2023-10-30

## 2023-11-02 ENCOUNTER — HOSPITAL ENCOUNTER (OUTPATIENT)
Dept: ENDOCRINOLOGY | Facility: HOSPITAL | Age: 63
Discharge: HOME | End: 2023-11-02
Attending: INTERNAL MEDICINE
Payer: MEDICARE

## 2023-11-02 DIAGNOSIS — E11.9 TYPE 2 DIABETES MELLITUS WITHOUT COMPLICATION, WITHOUT LONG-TERM CURRENT USE OF INSULIN (HCC): Primary | ICD-10-CM

## 2023-11-02 PROCEDURE — 97802 MEDICAL NUTRITION INDIV IN: CPT

## 2023-11-02 NOTE — PROGRESS NOTES
Medical Nutrition Therapy Assessment    Gwenlyn Lanes 1/3/1960 was seen for individual Diabetic Medical Nutrition Therapy:  initial/individual    Date: 11/2/2023   Start time 1515  End time: 0      Assessment  Pt with h/o diabetes for a few years. She states that she wants to improve her diet and lose some weight. Anthropometrics: There were no vitals taken for this visit. Current diabetes medications:  Metformin ER    Labs:  Lab Results   Component Value Date    A1C 6.9 (H) 05/07/2020    A1C 6.8 (H) 08/24/2019       SMBG at home: no    Diet Hx:  (a.m.) 1 egg, 1 Case[employer] sprouted bread  (mid-day) Leftovers, eating out more  (p.m.) 4 nights at home, not enough, chicken marsala  (snacks) maybe chips  (fluids) calories free alvarez    Physical Activity: not regular      Nutrition Diagnosis  Intake: inconsistent carbohydrate intake  Behavioral and environmental: nutrition and nutrition-related knowledge deficit      Intervention  Comprehensive Nutrition Education Provided:     [x] discussed healthy weight management, glucose management, lipid management, and BP management as related to diabetes   [x] discussed basic meal planning guidelines for diabetes regular mealtime, limited concentrated sweets.  Worked on establishing eating pattern/timing of meals and snacks    [x] discussed in depth meal planning using the healthy eating with diabetes plate method with focus on balanced macronutrient consumption, including identifying foods that are carbohydrates, lean protein, non-starchy vegetables, and heart healthy fats   [x] stressed importance of carbohydrate consistency in each meal   [x] recommended carbohydrate targets of 30-45 grams at meals and 0-20 grams at snacks   [x] educated on label reading   [x] educated on portion control; including use of measuring cups, spoons, or food scale   [] provided suggestions for lower carb, healthy snacks   [x] educated on importance of food monitoring and how to use food logs   [] discussed how to handle special occasions, dining out, and eating on the go   [x] discussed menu planning, healthy food shopping, cooking tips, and need to pre prepare foods    [] educated on emotional eating and being mindful at meals   [x]  reviewed other behavior modification strategies    []emphasized the need for small, sustainable changes and working on SMART goals to encourage sustainable behaviors    [x] instructions on how to use helpful websites or nutrition/tracking apps reviewed    [] taught to use carbohydrate to insulin ratio and insulin sensitivity factor    [] discussed barriers and overcoming barriers to best achieve meal planning goals    [] discussed Mediterranean diet/DASH diet, as appropriate, to address lipids or BP   [] reviewed renal diet components and how to incorporate this with diabetes meal planning     Education on Increased Physical Activity:  discussed how increased physical activity improves insulin resistance, blood glucose control, and heart health    Monitoring  diet modification/understanding, hemoglobin A1c, lipid panel, and weight trends    Evaluation  Behavioral Goal(s) selected:   Healthy Eating    Support Plan:  Journals such as Diabetes Forecast or Self-Management    Plan  Call Kendall Del Rosario RD at [phone] (option 3) for problems/concerns. No follow-ups on file.     Kendall Del Rosario RD

## 2024-01-26 ENCOUNTER — HOSPITAL ENCOUNTER (OUTPATIENT)
Dept: MAMMOGRAPHY | Age: 64
Discharge: HOME | End: 2024-01-26
Attending: INTERNAL MEDICINE
Payer: MEDICARE

## 2024-01-26 DIAGNOSIS — R92.30 DENSE BREAST: ICD-10-CM

## 2024-01-26 DIAGNOSIS — Z12.31 ENCOUNTER FOR SCREENING MAMMOGRAM FOR MALIGNANT NEOPLASM OF BREAST: ICD-10-CM

## 2024-01-26 PROCEDURE — 77063 BREAST TOMOSYNTHESIS BI: CPT | Performed by: INTERNAL MEDICINE

## 2024-01-26 PROCEDURE — 77067 SCR MAMMO BI INCL CAD: CPT | Performed by: INTERNAL MEDICINE

## 2024-01-29 ENCOUNTER — LAB ENCOUNTER (OUTPATIENT)
Dept: LAB | Age: 64
End: 2024-01-29
Attending: INTERNAL MEDICINE
Payer: MEDICARE

## 2024-01-29 ENCOUNTER — OFFICE VISIT (OUTPATIENT)
Dept: INTERNAL MEDICINE CLINIC | Facility: CLINIC | Age: 64
End: 2024-01-29

## 2024-01-29 VITALS
TEMPERATURE: 98 F | WEIGHT: 180 LBS | HEIGHT: 65 IN | RESPIRATION RATE: 16 BRPM | DIASTOLIC BLOOD PRESSURE: 86 MMHG | OXYGEN SATURATION: 98 % | HEART RATE: 69 BPM | BODY MASS INDEX: 29.99 KG/M2 | SYSTOLIC BLOOD PRESSURE: 148 MMHG

## 2024-01-29 DIAGNOSIS — E78.5 DYSLIPIDEMIA: Primary | ICD-10-CM

## 2024-01-29 DIAGNOSIS — E11.9 TYPE 2 DIABETES MELLITUS WITHOUT COMPLICATION, WITHOUT LONG-TERM CURRENT USE OF INSULIN (HCC): ICD-10-CM

## 2024-01-29 DIAGNOSIS — E78.5 DYSLIPIDEMIA: ICD-10-CM

## 2024-01-29 LAB
ALBUMIN SERPL-MCNC: 4.4 G/DL (ref 3.2–4.8)
ALBUMIN/GLOB SERPL: 1.8 {RATIO} (ref 1–2)
ALP LIVER SERPL-CCNC: 63 U/L
ALT SERPL-CCNC: 23 U/L
ANION GAP SERPL CALC-SCNC: 6 MMOL/L (ref 0–18)
AST SERPL-CCNC: 16 U/L (ref ?–34)
BILIRUB SERPL-MCNC: 0.5 MG/DL (ref 0.2–1.1)
BUN BLD-MCNC: 15 MG/DL (ref 9–23)
BUN/CREAT SERPL: 19.2 (ref 10–20)
CALCIUM BLD-MCNC: 9.8 MG/DL (ref 8.7–10.4)
CHLORIDE SERPL-SCNC: 108 MMOL/L (ref 98–112)
CHOLEST SERPL-MCNC: 175 MG/DL (ref ?–200)
CO2 SERPL-SCNC: 27 MMOL/L (ref 21–32)
CREAT BLD-MCNC: 0.78 MG/DL
CREAT UR-SCNC: 70.2 MG/DL
EGFRCR SERPLBLD CKD-EPI 2021: 84 ML/MIN/1.73M2 (ref 60–?)
EST. AVERAGE GLUCOSE BLD GHB EST-MCNC: 160 MG/DL (ref 68–126)
FASTING PATIENT LIPID ANSWER: YES
FASTING STATUS PATIENT QL REPORTED: YES
GLOBULIN PLAS-MCNC: 2.5 G/DL (ref 2.8–4.4)
GLUCOSE BLD-MCNC: 150 MG/DL (ref 70–99)
HBA1C MFR BLD: 7.2 % (ref ?–5.7)
HDLC SERPL-MCNC: 63 MG/DL (ref 40–59)
LDLC SERPL CALC-MCNC: 93 MG/DL (ref ?–100)
MICROALBUMIN UR-MCNC: 1.3 MG/DL
MICROALBUMIN/CREAT 24H UR-RTO: 18.5 UG/MG (ref ?–30)
NONHDLC SERPL-MCNC: 112 MG/DL (ref ?–130)
OSMOLALITY SERPL CALC.SUM OF ELEC: 296 MOSM/KG (ref 275–295)
POTASSIUM SERPL-SCNC: 4.1 MMOL/L (ref 3.5–5.1)
PROT SERPL-MCNC: 6.9 G/DL (ref 5.7–8.2)
SODIUM SERPL-SCNC: 141 MMOL/L (ref 136–145)
TRIGL SERPL-MCNC: 107 MG/DL (ref 30–149)
VLDLC SERPL CALC-MCNC: 17 MG/DL (ref 0–30)

## 2024-01-29 PROCEDURE — 82570 ASSAY OF URINE CREATININE: CPT

## 2024-01-29 PROCEDURE — 80061 LIPID PANEL: CPT

## 2024-01-29 PROCEDURE — 99214 OFFICE O/P EST MOD 30 MIN: CPT | Performed by: INTERNAL MEDICINE

## 2024-01-29 PROCEDURE — 80053 COMPREHEN METABOLIC PANEL: CPT

## 2024-01-29 PROCEDURE — 36415 COLL VENOUS BLD VENIPUNCTURE: CPT

## 2024-01-29 PROCEDURE — 83036 HEMOGLOBIN GLYCOSYLATED A1C: CPT

## 2024-01-29 PROCEDURE — 82043 UR ALBUMIN QUANTITATIVE: CPT

## 2024-01-29 NOTE — PROGRESS NOTES
Perez Narvaez is a 64 year old female.  Chief Complaint   Patient presents with    Follow - Up     4 mos       HPI:   Perez Narvaez is a 64 year old female who presents for: follow up    Overall doing well; no complaints today  Has lost weight  Doing intermittent fasting  Watching her carbs    L elbow has been bothering her x 1 month    Wt Readings from Last 6 Encounters:   24 180 lb (81.6 kg)   10/30/23 187 lb (84.8 kg)   10/05/22 180 lb (81.6 kg)   22 186 lb (84.4 kg)   20 186 lb (84.4 kg)   20 185 lb (83.9 kg)     Body mass index is 29.95 kg/m².       Current Outpatient Medications   Medication Sig Dispense Refill    metFORMIN  MG Oral Tablet 24 Hr Take 1 tablet (500 mg total) by mouth 2 (two) times daily with meals. 180 tablet 3    atorvastatin (LIPITOR) 10 MG Oral Tab Take 1 tablet (10 mg total) by mouth daily. 90 tablet 3      Past Medical History:   Diagnosis Date    Diabetes (HCC)     Hyperlipidemia 2019      Past Surgical History:   Procedure Laterality Date          COLONOSCOPY N/A 2020    Procedure: COLONOSCOPY;  Surgeon: Crow Moran MD;  Location: [facility] ENDOSCOPY    OTHER SURGICAL HISTORY      varicose vein  surgery     TONSILLECTOMY        Family History   Problem Relation Age of Onset    Cancer Father 67        Bladder    Other (renal failure) Mother 86      Social History:   Social History     Socioeconomic History    Marital status:    Tobacco Use    Smoking status: Never    Smokeless tobacco: Never   Vaping Use    Vaping Use: Never used   Substance and Sexual Activity    Alcohol use: Yes     Comment: Social    Drug use: No          REVIEW OF SYSTEMS:   GENERAL: feels well otherwise  MSK: L elbow discomfort      EXAM:   /86   Pulse 69   Temp 97.8 °F (36.6 °C) (Oral)   Resp 16   Ht 5' 5\" (1.651 m)   Wt 180 lb (81.6 kg)   SpO2 98%   BMI 29.95 kg/m²     GENERAL: well developed, well nourished, in no apparent  distress        ASSESSMENT AND PLAN:     Lateral epicondylitis  Limit lifting/activity x 2 weeks  Voltaren gel prn     varicose veins  Wear 15-20mmhg compression during daytime  -seeing specialist     Type 2 DM  Last A1c 7.2; check labs today  Continue metformin for now     Dyslipidemia    lipitor 10mg daily  Due for labs today     R knee Pain  Likely from OA. Well controlled at this time  - Tylenol 650 mg q4-6 hr PRN       Emili Lira DO  1/29/2024  8:33 AM

## 2024-01-30 ENCOUNTER — TELEPHONE (OUTPATIENT)
Dept: INTERNAL MEDICINE CLINIC | Facility: CLINIC | Age: 64
End: 2024-01-30

## 2024-01-30 DIAGNOSIS — E11.9 TYPE 2 DIABETES MELLITUS WITHOUT COMPLICATION, WITHOUT LONG-TERM CURRENT USE OF INSULIN (HCC): Primary | ICD-10-CM

## 2024-01-30 RX ORDER — METFORMIN HYDROCHLORIDE 500 MG/1
TABLET, EXTENDED RELEASE ORAL
Qty: 270 TABLET | Refills: 3 | Status: SHIPPED | OUTPATIENT
Start: 2024-01-30

## 2024-02-01 ENCOUNTER — HOSPITAL ENCOUNTER (OUTPATIENT)
Dept: MAMMOGRAPHY | Facility: HOSPITAL | Age: 64
Discharge: HOME | End: 2024-02-01
Attending: INTERNAL MEDICINE
Payer: MEDICARE

## 2024-02-01 DIAGNOSIS — R92.8 ABNORMAL MAMMOGRAM: ICD-10-CM

## 2024-02-01 PROCEDURE — 77065 DX MAMMO INCL CAD UNI: CPT | Performed by: INTERNAL MEDICINE

## 2024-02-01 PROCEDURE — 77061 BREAST TOMOSYNTHESIS UNI: CPT | Performed by: INTERNAL MEDICINE

## 2024-02-02 ENCOUNTER — PATIENT MESSAGE (OUTPATIENT)
Dept: INTERNAL MEDICINE CLINIC | Facility: CLINIC | Age: 64
End: 2024-02-02

## 2024-02-02 NOTE — TELEPHONE ENCOUNTER
From: Perez Narvaez  Sent: 2/2/2024 8:57 AM CST  To: [facility] Clinical Staff  Subject: results    Wonderful! Thank you for spending so much time with me!

## 2024-10-16 ENCOUNTER — TELEPHONE (OUTPATIENT)
Dept: INTERNAL MEDICINE CLINIC | Facility: CLINIC | Age: 64
End: 2024-10-16

## 2024-10-16 RX ORDER — VALACYCLOVIR HYDROCHLORIDE 1 G/1
1000 TABLET, FILM COATED ORAL EVERY 12 HOURS SCHEDULED
Qty: 21 TABLET | Refills: 0 | Status: SHIPPED | OUTPATIENT
Start: 2024-10-16 | End: 2024-10-23

## 2024-10-16 NOTE — TELEPHONE ENCOUNTER
To N,    I pended prescription for Valacyclovir    I called pt and she did not have pharmacy information as she is out of town .    Pt stated to just send prescription to her pharmacy as she will be back in town tomorrow.    [id number] - [place], [place] - [address] AT [facility], [phone], [phone] [address] [place] [address]   Phone: [phone] Fax: [phone]

## 2024-10-16 NOTE — TELEPHONE ENCOUNTER
Patient called complaining of a cold sore that started today.  Patient was told that she should start taking medication right away.  Dr. Lira gave her a prescription, in the past but she does not remember the name of it    Patient is currently on vacation, she will have pharmacy information when you call her back .

## 2024-10-16 NOTE — TELEPHONE ENCOUNTER
[facility] pharmacist called requesting clarification for Valacyclovir    Prescription was written for 1 tablet every 12 hours, for 7 days-21 tablets.  Should be 14 tablets

## 2024-11-01 RX ORDER — ATORVASTATIN CALCIUM 10 MG/1
10 TABLET, FILM COATED ORAL DAILY
Qty: 90 TABLET | Refills: 3 | Status: SHIPPED | OUTPATIENT
Start: 2024-11-01

## 2024-11-01 NOTE — TELEPHONE ENCOUNTER
Refill request is for a maintenance medication and has met the criteria specified in the Ambulatory Medication Refill Standing Order for eligibility, visits, laboratory, alerts and was sent to the requested pharmacy.    Requested Prescriptions     Signed Prescriptions Disp Refills    ATORVASTATIN 10 MG Oral Tab 90 tablet 3     Sig: TAKE 1 TABLET(10 MG) BY MOUTH DAILY     Authorizing Provider: NEMESIO CORBIN     Ordering User: COURTNEY ALSTON

## 2024-12-30 ENCOUNTER — OFFICE VISIT (OUTPATIENT)
Dept: INTERNAL MEDICINE CLINIC | Facility: CLINIC | Age: 64
End: 2024-12-30
Payer: MEDICARE

## 2024-12-30 VITALS
HEIGHT: 65 IN | DIASTOLIC BLOOD PRESSURE: 82 MMHG | HEART RATE: 68 BPM | OXYGEN SATURATION: 97 % | BODY MASS INDEX: 29.99 KG/M2 | SYSTOLIC BLOOD PRESSURE: 126 MMHG | WEIGHT: 180 LBS

## 2024-12-30 DIAGNOSIS — E78.5 DYSLIPIDEMIA: ICD-10-CM

## 2024-12-30 DIAGNOSIS — E11.9 TYPE 2 DIABETES MELLITUS WITHOUT COMPLICATION, WITHOUT LONG-TERM CURRENT USE OF INSULIN (HCC): ICD-10-CM

## 2024-12-30 DIAGNOSIS — E55.9 VITAMIN D DEFICIENCY: ICD-10-CM

## 2024-12-30 DIAGNOSIS — Z78.0 POSTMENOPAUSAL: ICD-10-CM

## 2024-12-30 DIAGNOSIS — Z00.00 ENCOUNTER FOR ANNUAL HEALTH EXAMINATION: Primary | ICD-10-CM

## 2024-12-30 DIAGNOSIS — R53.83 OTHER FATIGUE: ICD-10-CM

## 2024-12-30 DIAGNOSIS — Z00.00 ANNUAL PHYSICAL EXAM: ICD-10-CM

## 2024-12-30 DIAGNOSIS — Z12.31 ENCOUNTER FOR SCREENING MAMMOGRAM FOR MALIGNANT NEOPLASM OF BREAST: ICD-10-CM

## 2024-12-30 NOTE — PROGRESS NOTES
Subjective:   Perez Narvaez is a 64 year old female who presents for a Medicare Initial Preventative Physical Exam (Welcome to Medicare- < 12 months on Medicare) and scheduled follow up of multiple significant but stable problems.       PMH: type 2 DM, dyslipidemia  PSH: , breast reduction, tonsillectomy, vein stripping     [facility]     Gyne: csection with twins. No complications.   Pap smear about 2.5 year ago; did have some spotting and was evaluated by Dr. Boggs.   Menopause began age 50.      Colonoscopy done 2020; repeat in 10 yrs  Mammogram due    S[facility] [place]  Calcium score 37 ()      Sister with breast cancer age 72  Dad with bladder cancer    History/Other:   Fall Risk Assessment:   She has been screened for Falls and is low risk.      Cognitive Assessment:   She had a completely normal cognitive assessment - see flowsheet entries       Functional Ability/Status:   Perez Narvaez has a completely normal functional assessment. See flowsheet for details.      Depression Screening (PHQ-2/PHQ-9): PHQ-2 SCORE: 0  , done 2024   Last [place] Suicide Screening on 2024 was No Risk.Advanced Directives:   She does NOT have a Living Will. [Do you have a living will?: No]  She does NOT have a Power of  for Health Care. [Do you have a healthcare power of ?: No]  Not discussed      There is no problem list on file for this patient.    Allergies:  She has No Known Allergies.    Current Medications:  Outpatient Medications Marked as Taking for the 24 encounter (Office Visit) with Emili Lira DO   Medication Sig    ATORVASTATIN 10 MG Oral Tab TAKE 1 TABLET(10 MG) BY MOUTH DAILY    metFORMIN  MG Oral Tablet 24 Hr Take 1 with breakfast, and 2 with dinner       Medical History:  She  has a past medical history of Diabetes (HCC) and Hyperlipidemia (2019).  Surgical History:  She  has a past surgical history that  includes  (); other surgical history (); tonsillectomy; and colonoscopy (N/A, 2020).   Family History:  Her family history includes Cancer (age of onset: 67) in her father; renal failure (age of onset: 86) in her mother.  Social History:  She  reports that she has never smoked. She has never used smokeless tobacco. She reports current alcohol use. She reports that she does not use drugs.    Tobacco:  She has never smoked tobacco.    CAGE Alcohol Screen:   CAGE screening score of 0 on 2024, showing low risk of alcohol abuse.      Patient Care Team:  Emili Lira DO as PCP - General (Internal Medicine)    Review of Systems  GENERAL: feels well otherwise  SKIN: denies any unusual skin lesions  EYES: denies blurred vision or double vision  HEENT: denies nasal congestion, sinus pain or ST  LUNGS: denies shortness of breath with exertion  CARDIOVASCULAR: denies chest pain on exertion  GI: denies abdominal pain, denies heartburn  : denies dysuria, vaginal discharge or itching, no complaint of urinary incontinence   MUSCULOSKELETAL: denies back pain  NEURO: denies headaches    Objective:   Physical Exam  General Appearance:  Alert, cooperative, no distress, appears stated age   Head:  Normocephalic, without obvious abnormality, atraumatic   Eyes:  PERRL, conjunctiva/corneas clear, EOM's intact both eyes   Ears:  Normal TM's and external ear canals, both ears   Nose: Nares normal, septum midline,mucosa normal, no drainage or sinus tenderness   Throat: Lips, mucosa, and tongue normal; teeth and gums normal   Neck: Supple, symmetrical, trachea midline, no adenopathy;  thyroid: not enlarged, symmetric, no tenderness/mass/nodules; no carotid bruit or JVD   Back:   Symmetric, no curvature, ROM normal, no CVA tenderness   Lungs:   Clear to auscultation bilaterally, respirations unlabored   Heart:  Regular rate and rhythm, S1 and S2 normal, no murmur, rub, or gallop   Abdomen:   Soft, non-tender, bowel  sounds active all four quadrants,  no masses, no organomegaly   Pelvic: Deferred   Extremities: Extremities normal, atraumatic, no cyanosis or edema   Pulses: 2+ and symmetric   Skin: Skin color, texture, turgor normal, no rashes or lesions   Lymph nodes: Cervical, supraclavicular, and axillary nodes normal   Neurologic: Normal    and Breasts:  normal appearance, no masses or tenderness    /82   Pulse 68   Ht 5' 5\" (1.651 m)   Wt 180 lb (81.6 kg)   SpO2 97%   BMI 29.95 kg/m²  Estimated body mass index is 29.95 kg/m² as calculated from the following:    Height as of this encounter: 5' 5\" (1.651 m).    Weight as of this encounter: 180 lb (81.6 kg).    Medicare Hearing Assessment:   Hearing Screening    Screening Method: Whisper Test  Whisper Test Result: Pass           Visual Acuity:   Right Eye Visual Acuity: Uncorrected Right Eye Chart Acuity: 20/20   Left Eye Visual Acuity: Uncorrected Left Eye Chart Acuity: 20/20   Both Eyes Visual Acuity: Uncorrected Both Eyes Chart Acuity: 20/20   Able To Tolerate Visual Acuity: Yes          Assessment & Plan:   Perez Narvaez is a 64 year old female who presents for a Medicare Assessment.     1. Annual physical exam (Primary)  2. Encounter for screening mammogram for malignant neoplasm of breast  -     [facility] YADI 2D+3D SCREENING BILAT (CPT=77067/83219); Future; Expected date: 12/30/2024  3. Type 2 diabetes mellitus without complication, without long-term current use of insulin (HCC)  -     Comp Metabolic Panel (14); Future; Expected date: 12/30/2024  -     Hemoglobin A1C; Future; Expected date: 12/30/2024  -     Microalb/Creat Ratio, Random Urine; Future; Expected date: 12/30/2024  4. Dyslipidemia  -     Lipid Panel; Future; Expected date: 12/30/2024  5. Postmenopausal  -     XR DEXA BONE DENSITOMETRY (CPT=77080); Future; Expected date: 12/30/2024  6. Other fatigue  -     CBC With Differential With Platelet; Future; Expected date: 12/30/2024  -     TSH W Reflex To Free  T4; Future; Expected date: 12/30/2024  7. Vitamin D deficiency  -     Vitamin D; Future; Expected date: 12/30/2024    The patient indicates understanding of these issues and agrees to the plan.  Reinforced healthy diet, lifestyle, and exercise.      varicose veins  Wear 15-20mmhg compression during daytime  -seeing specialist    Type 2 DM  Due for labs  Ordered urine microalbumin    Dyslipidemia  lipitor 10mg daily    R knee Pain  Likely from OA. Well controlled at this time  - Tylenol 650 mg q4-6 hr PRN    Annual physical  Given orders for mammogram  Dexa due   Due for fasting labs      No follow-ups on file.     Emili Lira,     Supplementary Documentation:   General Health:  In the past six months, have you lost more than 10 pounds without trying?: 2 - No  Has your appetite been poor?: No  Type of Diet: Balanced  How does the patient maintain a good energy level?: Other  How would you describe your daily physical activity?: Light  How would you describe your current health state?: Good  How do you maintain positive mental well-being?: Puzzles;Social Interaction;Games;Visiting Friends;Visiting Family  On a scale of 0 to 10, with 0 being no pain and 10 being severe pain, what is your pain level?: 0 - (None)  In the past six months, have you experienced urine leakage?: 0-No  At any time do you feel concerned for the safety/well-being of yourself and/or your children, in your home or elsewhere?: No  Have you had any immunizations at another office such as Influenza, Hepatitis B, Tetanus, or Pneumococcal?: No         Perez Narvaez's SCREENING SCHEDULE   Tests on this list are recommended by your physician but may not be covered, or covered at this frequency, by your insurer.   Please check with your insurance carrier before scheduling to verify coverage.   PREVENTATIVE SERVICES FREQUENCY &  COVERAGE DETAILS LAST COMPLETION DATE   Diabetes Screening    Fasting Blood Sugar /  Glucose    One screening every 12 months if  never tested or if previously tested but not diagnosed with pre-diabetes   One screening every 6 months if diagnosed with pre-diabetes Lab Results   Component Value Date     (H) 01/29/2024        Cardiovascular Disease Screening    Lipid Panel  Cholesterol  Lipoprotein (HDL)  Triglycerides Covered every 5 years for all Medicare beneficiaries without apparent signs or symptoms of cardiovascular disease Lab Results   Component Value Date    CHOLEST 175 01/29/2024    HDL 63 (H) 01/29/2024    LDL 93 01/29/2024    TRIG 107 01/29/2024         Electrocardiogram (EKG)   Covered if needed at Welcome to Medicare, and non-screening if indicated for medical reasons 10/30/2023      Ultrasound Screening for Abdominal Aortic Aneurysm (AAA) Covered once in a lifetime for one of the following risk factors    Men who are 65-75 years old and have ever smoked    Anyone with a family history -     Colorectal Cancer Screening  Covered for ages 50-85; only need ONE of the following:    Colonoscopy   Covered every 10 years    Covered every 2 years if patient is at high risk or previous colonoscopy was abnormal 07/21/2020    Health Maintenance   Topic Date Due    Colorectal Cancer Screening  07/21/2030       Flexible Sigmoidoscopy   Covered every 4 years -    Fecal Occult Blood Test Covered annually -   Bone Density Screening    Bone density screening    Covered every 2 years after age 65 if diagnosed with risk of osteoporosis or estrogen deficiency.    Covered yearly for long-term glucocorticoid medication use (Steroids) Last Dexa Scan:    XR DEXA BONE DENSITOMETRY (CPT=77080) 10/06/2022      No recommendations at this time   Pap and Pelvic    Pap   Covered every 2 years for women at normal risk; Annually if at high risk 09/07/2022  No recommendations at this time    Chlamydia Annually if high risk -  No recommendations at this time   Screening Mammogram    Mammogram     Recommend annually for all female patients aged 40 and  older    One baseline mammogram covered for patients aged 35-39 02/01/2024    Health Maintenance   Topic Date Due    Mammogram  02/01/2025       Immunizations    Influenza Covered once per flu season  Please get every year -  No recommendations at this time    Pneumococcal Each vaccine ([id number] & Ibgheyiqv48) covered once after 65 Prevnar 13: -    [id number]: -     No recommendations at this time    Hepatitis B One screening covered for patients with certain risk factors   -  No recommendations at this time    Tetanus Toxoid Not covered by Medicare Part B unless medically necessary (cut with metal); may be covered with your pharmacy prescription benefits -    Tetanus, Diptheria and Pertusis TD and TDaP Not covered by Medicare Part B -  No recommendations at this time    Zoster Not covered by Medicare Part B; may be covered with your pharmacy  prescription benefits -  No recommendations at this time     Diabetes      Hemoglobin A1C Annually; if last result is elevated, may be asked to retest more frequently.    Medicare covers every 3 months Lab Results   Component Value Date     (H) 01/29/2024    A1C 7.2 (H) 01/29/2024       Hemoglobin A1C Test due on 07/29/2024    Creat/alb ratio Annually Lab Results   Component Value Date    MICROALBCREA 18.5 01/29/2024       LDL Annually Lab Results   Component Value Date    LDL 93 01/29/2024       Dilated Eye Exam Annually Last Diabetic Eye Exam:  No data recorded  No data recorded

## 2025-02-27 ENCOUNTER — HOSPITAL ENCOUNTER (OUTPATIENT)
Dept: MAMMOGRAPHY | Age: 65
Discharge: HOME | End: 2025-02-27
Attending: INTERNAL MEDICINE
Payer: MEDICARE

## 2025-02-27 DIAGNOSIS — Z12.31 ENCOUNTER FOR SCREENING MAMMOGRAM FOR MALIGNANT NEOPLASM OF BREAST: ICD-10-CM

## 2025-02-27 PROCEDURE — 77067 SCR MAMMO BI INCL CAD: CPT | Performed by: INTERNAL MEDICINE

## 2025-02-27 PROCEDURE — 77063 BREAST TOMOSYNTHESIS BI: CPT | Performed by: INTERNAL MEDICINE

## 2025-03-03 ENCOUNTER — HOSPITAL ENCOUNTER (OUTPATIENT)
Dept: BONE DENSITY | Age: 65
Discharge: HOME | End: 2025-03-03
Attending: INTERNAL MEDICINE
Payer: MEDICARE

## 2025-03-03 DIAGNOSIS — Z78.0 POSTMENOPAUSAL: ICD-10-CM

## 2025-03-03 PROCEDURE — 77080 DXA BONE DENSITY AXIAL: CPT | Performed by: INTERNAL MEDICINE

## 2025-03-09 DIAGNOSIS — E11.9 TYPE 2 DIABETES MELLITUS WITHOUT COMPLICATION, WITHOUT LONG-TERM CURRENT USE OF INSULIN (HCC): ICD-10-CM

## 2025-03-10 RX ORDER — METFORMIN HYDROCHLORIDE 500 MG/1
TABLET, EXTENDED RELEASE ORAL
Qty: 90 TABLET | Refills: 0 | Status: SHIPPED | OUTPATIENT
Start: 2025-03-10

## 2025-03-10 NOTE — TELEPHONE ENCOUNTER
Requested Prescriptions     Signed Prescriptions Disp Refills    metFORMIN  MG Oral Tablet 24 Hr 90 tablet 0     Sig: TAKE 1 TABLET BY MOUTH WITH BREAKFAST AND 2 TABLETS WITH DINNER     Authorizing Provider: NEMESIO CORBIN     Ordering User: CECILIA FISH        Patient overdue for fasting labs, short-term supply refilled.

## 2025-03-20 ENCOUNTER — HOSPITAL ENCOUNTER (OUTPATIENT)
Dept: MAMMOGRAPHY | Facility: HOSPITAL | Age: 65
Discharge: HOME | End: 2025-03-20
Attending: INTERNAL MEDICINE
Payer: MEDICARE

## 2025-03-20 ENCOUNTER — TELEPHONE (OUTPATIENT)
Dept: INTERNAL MEDICINE CLINIC | Facility: CLINIC | Age: 65
End: 2025-03-20

## 2025-03-20 DIAGNOSIS — R92.8 ABNORMAL MAMMOGRAM: Primary | ICD-10-CM

## 2025-03-20 DIAGNOSIS — R92.2 INCONCLUSIVE MAMMOGRAM: ICD-10-CM

## 2025-03-20 PROCEDURE — 76642 ULTRASOUND BREAST LIMITED: CPT | Performed by: INTERNAL MEDICINE

## 2025-03-20 PROCEDURE — 77065 DX MAMMO INCL CAD UNI: CPT | Performed by: INTERNAL MEDICINE

## 2025-03-20 PROCEDURE — 77061 BREAST TOMOSYNTHESIS UNI: CPT | Performed by: INTERNAL MEDICINE

## 2025-03-20 NOTE — TELEPHONE ENCOUNTER
Tried to reach pt--got VM  Please contact patient directly; her follow up imaging on the left breast shows abnormality but nothing seen on ultrasound; radiologist recommended MRI, and I have ordered for this for her; would recommend that she schedule this

## 2025-03-20 NOTE — TELEPHONE ENCOUNTER
Patient contacted and relayed 's message below. Patient verbalized understanding and will schedule the MRI.

## 2025-03-22 DIAGNOSIS — E11.9 TYPE 2 DIABETES MELLITUS WITHOUT COMPLICATION, WITHOUT LONG-TERM CURRENT USE OF INSULIN (HCC): ICD-10-CM

## 2025-03-24 NOTE — TELEPHONE ENCOUNTER
A 30 day supply was sent in for patient on 3/10 due to overdue for labs   Mychart sent to patient to remind to do labs

## 2025-04-03 ENCOUNTER — TELEPHONE (OUTPATIENT)
Dept: INTERNAL MEDICINE CLINIC | Facility: CLINIC | Age: 65
End: 2025-04-03

## 2025-04-03 DIAGNOSIS — F40.240 CLAUSTROPHOBIA: ICD-10-CM

## 2025-04-03 DIAGNOSIS — Z00.00 ENCOUNTER FOR ANNUAL HEALTH EXAMINATION: Primary | ICD-10-CM

## 2025-04-03 RX ORDER — DIAZEPAM 2 MG/1
2 TABLET ORAL EVERY 6 HOURS PRN
Qty: 5 TABLET | Refills: 0 | Status: SHIPPED | OUTPATIENT
Start: 2025-04-03

## 2025-04-03 NOTE — TELEPHONE ENCOUNTER
To Dr. SANTIAGO    Spoke with pt.     She is scheduled for Breast MRI tomorrow.    She has never previously had a MRI.     Denies claustrophobia.    Reports her issue is restlessness/frequent repositioning of her legs. States she cannot seem to sit still even when at home watching TV.    Asking if something can be prescribed to help her relax for MRI.    Pt reports she will have a  to/from MRI.     Please advise.     [id number] [place], [place]

## 2025-04-03 NOTE — TELEPHONE ENCOUNTER
Patient requests Rx for something to help relax her before an MRI scheduled for tomorrow morning     [facility]     Call back # [phone]

## 2025-04-04 ENCOUNTER — HOSPITAL ENCOUNTER (OUTPATIENT)
Dept: MRI IMAGING | Facility: HOSPITAL | Age: 65
Discharge: HOME | End: 2025-04-04
Attending: INTERNAL MEDICINE
Payer: MEDICARE

## 2025-04-04 ENCOUNTER — TELEPHONE (OUTPATIENT)
Dept: INTERNAL MEDICINE CLINIC | Facility: CLINIC | Age: 65
End: 2025-04-04

## 2025-04-04 ENCOUNTER — TELEPHONE (OUTPATIENT)
Dept: MAMMOGRAPHY | Facility: HOSPITAL | Age: 65
End: 2025-04-04

## 2025-04-04 DIAGNOSIS — R92.8 ABNORMAL MAMMOGRAM: ICD-10-CM

## 2025-04-04 DIAGNOSIS — R92.8 ABNORMAL MAMMOGRAM: Primary | ICD-10-CM

## 2025-04-04 PROCEDURE — 77049 MRI BREAST C-+ W/CAD BI: CPT | Performed by: INTERNAL MEDICINE

## 2025-04-04 PROCEDURE — A9575 INJ GADOTERATE MEGLUMI 0.1ML: HCPCS | Performed by: INTERNAL MEDICINE

## 2025-04-04 RX ORDER — GADOTERATE MEGLUMINE 376.9 MG/ML
20 INJECTION INTRAVENOUS
Status: COMPLETED | OUTPATIENT
Start: 2025-04-04 | End: 2025-04-04

## 2025-04-04 RX ADMIN — GADOTERATE MEGLUMINE 18 ML: 376.9 INJECTION INTRAVENOUS at 08:59:00

## 2025-04-04 NOTE — TELEPHONE ENCOUNTER
This Breast Care RN phoned pt re left breast 1 site MRI guided biopsy recommendation by Dr. Overton for enhancement. Procedure reviewed and all questions answered. Reviewed educational handouts.  On the day of the biopsy, pt instructed to take Tylenol 1000mg PO, eat a light meal & bring or wear a sports bra.  Post biopsy care also reviewed with pt to include NO lifting more than 5lbs, no exercising or housework (limit upper body movement) for 24-48 hrs post biopsy. Patient denies blood thinners, bleeding disorders, liver disease, and chemo. Pt verbalized understanding.  Our breast center schedulers will be calling to schedule an appt that is convenient for pt.

## 2025-04-04 NOTE — TELEPHONE ENCOUNTER
To Dr. SANTIAGO     Spoke to Marissa RN from Radiology    Stated that Radiologist is recommending left beast biopsy- asked that once primary notified patient of results and order is placed. Radiology department will call patient to schedule

## 2025-04-07 ENCOUNTER — LAB ENCOUNTER (OUTPATIENT)
Dept: LAB | Facility: HOSPITAL | Age: 65
End: 2025-04-07
Attending: INTERNAL MEDICINE
Payer: MEDICARE

## 2025-04-07 DIAGNOSIS — R53.83 OTHER FATIGUE: ICD-10-CM

## 2025-04-07 DIAGNOSIS — E55.9 VITAMIN D DEFICIENCY: ICD-10-CM

## 2025-04-07 DIAGNOSIS — E11.9 TYPE 2 DIABETES MELLITUS WITHOUT COMPLICATION, WITHOUT LONG-TERM CURRENT USE OF INSULIN (HCC): ICD-10-CM

## 2025-04-07 DIAGNOSIS — E78.5 DYSLIPIDEMIA: ICD-10-CM

## 2025-04-07 LAB
ALBUMIN SERPL-MCNC: 4.4 G/DL (ref 3.2–4.8)
ALBUMIN/GLOB SERPL: 1.8 {RATIO} (ref 1–2)
ALP LIVER SERPL-CCNC: 63 U/L
ALT SERPL-CCNC: 13 U/L
ANION GAP SERPL CALC-SCNC: 7 MMOL/L (ref 0–18)
AST SERPL-CCNC: 13 U/L (ref ?–34)
BASOPHILS # BLD AUTO: 0.05 X10(3) UL (ref 0–0.2)
BASOPHILS NFR BLD AUTO: 0.7 %
BILIRUB SERPL-MCNC: 0.5 MG/DL (ref 0.2–1.1)
BUN BLD-MCNC: 19 MG/DL (ref 9–23)
BUN/CREAT SERPL: 27.9 (ref 10–20)
CALCIUM BLD-MCNC: 9.5 MG/DL (ref 8.7–10.4)
CHLORIDE SERPL-SCNC: 105 MMOL/L (ref 98–112)
CHOLEST SERPL-MCNC: 168 MG/DL (ref ?–200)
CO2 SERPL-SCNC: 30 MMOL/L (ref 21–32)
CREAT BLD-MCNC: 0.68 MG/DL
CREAT UR-SCNC: 106.2 MG/DL
DEPRECATED RDW RBC AUTO: 44.2 FL (ref 35.1–46.3)
EGFRCR SERPLBLD CKD-EPI 2021: 95 ML/MIN/1.73M2 (ref 60–?)
EOSINOPHIL # BLD AUTO: 0.25 X10(3) UL (ref 0–0.7)
EOSINOPHIL NFR BLD AUTO: 3.7 %
ERYTHROCYTE [DISTWIDTH] IN BLOOD BY AUTOMATED COUNT: 13.1 % (ref 11–15)
EST. AVERAGE GLUCOSE BLD GHB EST-MCNC: 151 MG/DL (ref 68–126)
FASTING PATIENT LIPID ANSWER: YES
FASTING STATUS PATIENT QL REPORTED: YES
GLOBULIN PLAS-MCNC: 2.5 G/DL (ref 2–3.5)
GLUCOSE BLD-MCNC: 117 MG/DL (ref 70–99)
HBA1C MFR BLD: 6.9 % (ref ?–5.7)
HCT VFR BLD AUTO: 40.7 %
HDLC SERPL-MCNC: 58 MG/DL (ref 40–59)
HGB BLD-MCNC: 12.7 G/DL
IMM GRANULOCYTES # BLD AUTO: 0.02 X10(3) UL (ref 0–1)
IMM GRANULOCYTES NFR BLD: 0.3 %
LDLC SERPL CALC-MCNC: 93 MG/DL (ref ?–100)
LYMPHOCYTES # BLD AUTO: 2.23 X10(3) UL (ref 1–4)
LYMPHOCYTES NFR BLD AUTO: 33.1 %
MCH RBC QN AUTO: 28.7 PG (ref 26–34)
MCHC RBC AUTO-ENTMCNC: 31.2 G/DL (ref 31–37)
MCV RBC AUTO: 92.1 FL
MICROALBUMIN UR-MCNC: 0.5 MG/DL
MICROALBUMIN/CREAT 24H UR-RTO: 4.7 UG/MG (ref ?–30)
MONOCYTES # BLD AUTO: 0.48 X10(3) UL (ref 0.1–1)
MONOCYTES NFR BLD AUTO: 7.1 %
NEUTROPHILS # BLD AUTO: 3.71 X10 (3) UL (ref 1.5–7.7)
NEUTROPHILS # BLD AUTO: 3.71 X10(3) UL (ref 1.5–7.7)
NEUTROPHILS NFR BLD AUTO: 55.1 %
NONHDLC SERPL-MCNC: 110 MG/DL (ref ?–130)
OSMOLALITY SERPL CALC.SUM OF ELEC: 297 MOSM/KG (ref 275–295)
PLATELET # BLD AUTO: 251 10(3)UL (ref 150–450)
POTASSIUM SERPL-SCNC: 4.4 MMOL/L (ref 3.5–5.1)
PROT SERPL-MCNC: 6.9 G/DL (ref 5.7–8.2)
RBC # BLD AUTO: 4.42 X10(6)UL
SODIUM SERPL-SCNC: 142 MMOL/L (ref 136–145)
TRIGL SERPL-MCNC: 92 MG/DL (ref 30–149)
TSI SER-ACNC: 1.47 UIU/ML (ref 0.55–4.78)
VIT D+METAB SERPL-MCNC: 39.5 NG/ML (ref 30–100)
VLDLC SERPL CALC-MCNC: 15 MG/DL (ref 0–30)
WBC # BLD AUTO: 6.7 X10(3) UL (ref 4–11)

## 2025-04-07 PROCEDURE — 82570 ASSAY OF URINE CREATININE: CPT

## 2025-04-07 PROCEDURE — 85025 COMPLETE CBC W/AUTO DIFF WBC: CPT

## 2025-04-07 PROCEDURE — 36415 COLL VENOUS BLD VENIPUNCTURE: CPT

## 2025-04-07 PROCEDURE — 82043 UR ALBUMIN QUANTITATIVE: CPT

## 2025-04-07 PROCEDURE — 82306 VITAMIN D 25 HYDROXY: CPT

## 2025-04-07 PROCEDURE — 80061 LIPID PANEL: CPT

## 2025-04-07 PROCEDURE — 80053 COMPREHEN METABOLIC PANEL: CPT

## 2025-04-07 PROCEDURE — 84443 ASSAY THYROID STIM HORMONE: CPT

## 2025-04-07 PROCEDURE — 83036 HEMOGLOBIN GLYCOSYLATED A1C: CPT

## 2025-04-07 NOTE — TELEPHONE ENCOUNTER
Please notify pt that I did put in the biopsy order for her--she can call any schedule--I will keep an eye out for this and we will discuss after

## 2025-04-11 ENCOUNTER — TELEPHONE (OUTPATIENT)
Dept: INTERNAL MEDICINE CLINIC | Facility: CLINIC | Age: 65
End: 2025-04-11

## 2025-04-11 DIAGNOSIS — E11.9 TYPE 2 DIABETES MELLITUS WITHOUT COMPLICATION, WITHOUT LONG-TERM CURRENT USE OF INSULIN (HCC): Primary | ICD-10-CM

## 2025-04-11 DIAGNOSIS — E78.5 DYSLIPIDEMIA: ICD-10-CM

## 2025-04-11 RX ORDER — ATORVASTATIN CALCIUM 20 MG/1
20 TABLET, FILM COATED ORAL DAILY
Qty: 90 TABLET | Refills: 3 | Status: SHIPPED | OUTPATIENT
Start: 2025-04-11 | End: 2026-04-06

## 2025-04-11 NOTE — TELEPHONE ENCOUNTER
Note that patient already has MRI biopsy scheduled for 4/16  Pt spoke to breast care RN on 4/4  Will close this encounter

## 2025-04-11 NOTE — TELEPHONE ENCOUNTER
Please notify pt that overall her blood work looks good--Diabetes control is slightly better than last year  Her LDL should be less than 70---so I have increased her lipitor from 10mg to 20mg  Would recommend repeat labs in 6 months  I know she is getting MRI for breast imaging; will keep an eye out

## 2025-04-11 NOTE — TELEPHONE ENCOUNTER
Called patient and relayed  message- verbalized understanding   Had eye exam done and will bring note to our office

## 2025-04-14 NOTE — DISCHARGE INSTRUCTIONS
Discharge Instructions  MRI Core Biopsy     Place an ice pack on top of the biopsy site in your bra OR the folds of the Ace wrap for 10-15 minutes every hour until bedtime for your comfort and to decrease bleeding.     Keep your supportive bra OR the Ace wrap in place for 24 hours after your biopsy. This compression decreases bleeding and breast movement for your comfort. Wear a supportive bra for the next couple days for comfort (as well as for sleeping)     No bath or shower during the first 24 hours after biopsy. After this time, you may take a bath or shower. It’s okay if the steri-strips get wet but don’t soak them.   No saunas, hot tubs, or swimming pools until the steri-strips fall off (5-7days).  This prevents infection and allows time for the area to completely close and heal.     DO NOT remove the steri-strips. They will fall off in 5 days to two weeks. If any type of irritation (redness, itching, OR blisters) develops in the area around the steri-strips, remove them gently. Keep the area clean and dry.     It is normal to have mild discomfort and bruising at the biopsy site.      You may take Tylenol as needed for discomfort.     DO NOT participate in strenuous activity (aerobics, heavy lifting, housework, gardening, etc.) 48 hours after your biopsy to prevent bleeding.     You will receive results typically within 2-3 business days. Occasionally, the specimen is sent off-site for a 2nd opinion. You will be notified when this occurs as this will delay your results.     If you have any questions about the procedure or your results, please contact the Breast Care Coordinator Nurse at [phone]. (M-F 7:30-4)    If you are having a MRI Breast Biopsy or an Ultrasound guided biopsy, you will be billed for the biopsy and a unilateral mammogram separately.    A 6-month or one year follow-up Diagnostic Mammogram/Ultrasound will be recommended to document stability of the biopsy site. It may be scheduled at  [facility] or [facility] by calling [phone]. You will need an order for this exam from your referring physician.       5/2024

## 2025-04-16 ENCOUNTER — HOSPITAL ENCOUNTER (OUTPATIENT)
Dept: MRI IMAGING | Facility: HOSPITAL | Age: 65
Discharge: HOME | End: 2025-04-16
Attending: INTERNAL MEDICINE
Payer: MEDICARE

## 2025-04-16 ENCOUNTER — HOSPITAL ENCOUNTER (OUTPATIENT)
Dept: MAMMOGRAPHY | Facility: HOSPITAL | Age: 65
Discharge: HOME | End: 2025-04-16
Attending: INTERNAL MEDICINE
Payer: MEDICARE

## 2025-04-16 DIAGNOSIS — R92.8 OTHER ABNORMAL AND INCONCLUSIVE FINDINGS ON DIAGNOSTIC IMAGING OF BREAST: ICD-10-CM

## 2025-04-16 PROCEDURE — 88344 IMHCHEM/IMCYTCHM EA MLT ANTB: CPT | Performed by: INTERNAL MEDICINE

## 2025-04-16 PROCEDURE — 77065 DX MAMMO INCL CAD UNI: CPT | Performed by: INTERNAL MEDICINE

## 2025-04-16 PROCEDURE — 88360 TUMOR IMMUNOHISTOCHEM/MANUAL: CPT | Performed by: INTERNAL MEDICINE

## 2025-04-16 PROCEDURE — 19085 BX BREAST 1ST LESION MR IMAG: CPT | Performed by: INTERNAL MEDICINE

## 2025-04-16 PROCEDURE — A9575 INJ GADOTERATE MEGLUMI 0.1ML: HCPCS | Performed by: INTERNAL MEDICINE

## 2025-04-16 PROCEDURE — 88305 TISSUE EXAM BY PATHOLOGIST: CPT | Performed by: INTERNAL MEDICINE

## 2025-04-16 RX ORDER — GADOTERATE MEGLUMINE 376.9 MG/ML
20 INJECTION INTRAVENOUS
Status: COMPLETED | OUTPATIENT
Start: 2025-04-16 | End: 2025-04-16

## 2025-04-16 RX ADMIN — GADOTERATE MEGLUMINE 17 ML: 376.9 INJECTION INTRAVENOUS at 08:00:00

## 2025-04-16 NOTE — IMAGING NOTE
Received patient in stable condition post left breast MRI guided biopsy. No bleeding or oozing at site. Steri strip in place. Patient provided with ice pack and offered juice. Patient has no further questions regarding discharge. Patient states that she feels fine. Post biopsy mammogram images completed and patient discharged in stable condition to home with .

## 2025-04-16 NOTE — IMAGING NOTE
Pt post left breast MRI biopsy. No bleeding or hematoma noted at the site. Steristrips placed and intact.  Education and written discharge instructions provided. Verbalized understanding of all instructions. Questions addressed. Emotional support provided. Pt escorted to [facility] for post mammo.  Denies any pain and gait steady. SBAR to [place].

## 2025-04-18 ENCOUNTER — TELEPHONE (OUTPATIENT)
Dept: INTERNAL MEDICINE CLINIC | Facility: CLINIC | Age: 65
End: 2025-04-18

## 2025-04-18 NOTE — TELEPHONE ENCOUNTER
Patient is calling requesting Biopsy test result from her 4/16/25 breast biopsy.    Please call and advise patient is very concerned.    [phone]

## 2025-04-18 NOTE — IMAGING NOTE
1324: Spoke with Perez Narvaez post magnetic resonance image guided left breast biopsy.  Introduced myself as one of the breast nurse coordinators at [facility] and informed Ms. Narvaez of the purpose of my call.  Name and date of birth verified by patient.    Perez Narvaez confirmed awareness of breast pathology results-MyChart notification.    Pathology results and recommendations discussed as follows:   Final Diagnosis:   MRI-guided core biopsy, left breast, 12 o'clock position, enhancement:  -Ductal carcinoma in situ (DCIS).           See EMR for complete pathology report    Per Dr. Romero-Pathology shows the presence of DCIS. This is concordant with the imaging assessment. Surgical/oncological consultation is recommended.     Dr. Emili Lira referring to Dr. Lauren Hernandez-see [facility] breast biopsy order  Appointment offered with Dr. Hernandez-Wednesday, April 23 at 0900 in [place].  Perez Narvaez accepted the appointment.  Appointment confirmed with [facility] breast nurse navigator Janet ISIDRO  Encouraged Perez Narvaez to follow up with her referring provider Dr. Lira or the breast center if she has questions or concerns.    Perez Narvaez verbalized understanding and agreement to the above.    Reinforced post biopsy care and instruction.  Perez Narvaez denies any issues with biopsy site- bleeding, drainage, redness, tenderness.

## 2025-04-22 RX ORDER — METFORMIN HYDROCHLORIDE 500 MG/1
TABLET, EXTENDED RELEASE ORAL
Qty: 270 TABLET | Refills: 2 | Status: SHIPPED | OUTPATIENT
Start: 2025-04-22

## 2025-04-22 NOTE — TELEPHONE ENCOUNTER
Refill request is for a maintenance medication and has met the criteria specified in the Ambulatory Medication Refill Standing Order for eligibility, visits, laboratory, alerts and was sent to the requested pharmacy.    Requested Prescriptions     Signed Prescriptions Disp Refills    metFORMIN  MG Oral Tablet 24 Hr 270 tablet 2     Sig: TAKE 1 TABLET BY MOUTH WITH BREAKFAST AND 2 TABLETS WITH DINNER     Authorizing Provider: NEMESIO CORBIN     Ordering User: CECILIA FISH

## 2025-04-22 NOTE — TELEPHONE ENCOUNTER
Patient called re: Metformin refill  Patient had labs done and needs Rx for 90 day supply with refills

## 2025-04-23 ENCOUNTER — NURSE NAVIGATOR ENCOUNTER (OUTPATIENT)
Age: 65
End: 2025-04-23

## 2025-04-23 ENCOUNTER — OFFICE VISIT (OUTPATIENT)
Facility: CLINIC | Age: 65
End: 2025-04-23
Payer: MEDICARE

## 2025-04-23 ENCOUNTER — PATIENT OUTREACH (OUTPATIENT)
Age: 65
End: 2025-04-23

## 2025-04-23 ENCOUNTER — TELEPHONE (OUTPATIENT)
Dept: SURGERY | Facility: CLINIC | Age: 65
End: 2025-04-23

## 2025-04-23 ENCOUNTER — TELEPHONE (OUTPATIENT)
Dept: INTERNAL MEDICINE CLINIC | Facility: CLINIC | Age: 65
End: 2025-04-23

## 2025-04-23 VITALS
TEMPERATURE: 97 F | RESPIRATION RATE: 16 BRPM | BODY MASS INDEX: 30.82 KG/M2 | HEIGHT: 65 IN | HEART RATE: 91 BPM | OXYGEN SATURATION: 94 % | WEIGHT: 185 LBS | SYSTOLIC BLOOD PRESSURE: 147 MMHG | DIASTOLIC BLOOD PRESSURE: 80 MMHG

## 2025-04-23 DIAGNOSIS — D05.12 BREAST NEOPLASM, TIS (DCIS), LEFT: Primary | ICD-10-CM

## 2025-04-23 PROCEDURE — 99205 OFFICE O/P NEW HI 60 MIN: CPT | Performed by: SURGERY

## 2025-04-23 NOTE — TELEPHONE ENCOUNTER
Calling pt in regards to scheduling surgery.  Informed pt that I have 06/02/2025 available at [facility] with Dr. Hernandez.  Pt verbalized understanding and in agreement with date and location.  All questions answered.   Encouraged pt to call or [employer] message office with any other questions or concerns.

## 2025-04-23 NOTE — PROGRESS NOTES
Patient presents for consultation with Dr. Hernandez.  She is accompanied by her spouse, Sarbjit for support.    Patient and spouse reside in [place].  Patient is a retired teacher.  She is currently working part time for the [employer] in their [employer]  program, and continues to substitute as well.  Patient's spouse Sarbjit is a HS .  They share 4 children.  Their 1 daughter is currently expecting their 6th grandchild.  Patient plans to go to [place] to be with her daughter in May.  Patient's other 3 children are local to [facility].  Patient shares she enjoys shopping in her spare time.  No barriers to care identified.    Provided patient with a Journey Map and educated as to her anticipated clinical course of care.  Provided patient with a Breast Cancer Treatment Handbook and educated as to how to use this resource.  Flyer provided for our Forms Department, should she or spouse have any [facility] paperwork to be completed.  Discussed supportive resources available.  This included a flyer for [facility] provider, as well as [facility].  Discussed available programs and services.  Provided patient information for local shops carrying support bras for post op.    Plan to proceed with left breast wire localized lumpectomy with MAC.  Plan to schedule after May 19th to allow patient to be with her daughter in [place].    Discussed with patient once surgical date determined will look to schedule post op office follow up as well as Medical Oncology consultation.    Reinforced the role of Nurse Navigator.  Encouraged patient call or message with any questions or concerns.

## 2025-04-23 NOTE — PROGRESS NOTES
Breast Surgery New Patient Consultation    This is the first visit for this 65 year old woman, referred by Dr. Lira.    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  She denies any palpable masses, nipple discharge, skin changes or axillary symptoms.  She does have a family history of breast cancer.  She has no personal prior history of breast disease or biopsies.  She had a routine screening mammogram on 2025 that showed a new asymmetry in the left breast.  She had a left diagnostic evaluation on 2025 that confirmed this to partially dissipate with compression and therefore an MRI was recommended.  The MRI took place on 2025 and she was found to have an avid 2.1 x 1.2 x 1.5 cm area of enhancement at the 12 o'clock position of the left breast for which a biopsy was recommended with no suspicious axillary adenopathy.  Her biopsy took place on 2025 confirming ductal carcinoma in situ measuring up to 4 mm that was ER/NV positive.She is here today for evaluation and recommendations for further therapy.        Past Medical History[1]    Past Surgical History[2]    Gynecological History:  Pt is a   Pt was 26 years old at time of first pregnancy.    She denies any cumulative breastfeeding history   She achieved menarche at age unknown and LMP unknown  Age of Menopause:   Type:   She denies any history of hormone replacement therapy  She has history of oral contraceptive use for 1 years, last in 1970s.  She denies infertility treatment to achieve pregnancy.    Medications:    Current Medications[3]    Allergies:    Allergies[4]    Family History:   Family History[5]    She is not of Ashkenazi Jehovah's witness ancestry.    Social History:  History   Alcohol Use    Yes     Comment: Social       History   Smoking Status    Never   Smokeless Tobacco    Never     Ms. Perez Narvaez is  with 4 children.  She has 2 siblings. She is currently Employed part-time    Review of Systems:  General:   The patient denies, fever, chills, night sweats, fatigue, generalized weakness, change in appetite or weight loss.    HEENT:     The patient denies eye irritation, cataracts, redness, glaucoma, yellowing of the eyes, change in vision, color blindness, or +wearing contacts/glasses. The patient denies hearing loss, ringing in the ears, ear drainage, earaches, nasal congestion, nose bleeds, snoring, pain in mouth/throat, hoarseness, change in voice, facial trauma.    Respiratory:  The patient denies chronic cough, phlegm, hemoptysis, pleurisy/chest pain, pneumonia, asthma, wheezing, difficulty in breathing with exertion, emphysema, chronic bronchitis, shortness of breath or abnormal sound when breathing.     Cardiovascular:  There is no history of chest pain, chest pressure/discomfort, palpitations, irregular heartbeat, fainting or near-fainting, difficulty breathing when lying flat, SOB/Coughing at night, swelling of the legs or chest pain while walking.    Breasts:  See history of present illness    Gastrointestinal:     There is no history of difficulty or pain with swallowing, reflux symptoms, vomiting, dark or bloody stools, constipation, yellowing of the skin, indigestion, nausea, change in bowel habits, diarrhea, abdominal pain or vomiting blood.     Genitourinary:  The patient denies frequent urination, needing to get up at night to urinate, urinary hesitancy or retaining urine, painful urination, urinary incontinence, decreased urine stream, blood in the urine or vaginal/penile discharge.    Skin:    The patient denies rash, itching, skin lesions, dry skin, change in skin color or change in moles.     Hematologic/Lymphatic:  The patient denies easily bruising or bleeding or persistent swollen glands or lymph nodes.     Musculoskeletal:  The patient denies muscle aches/pain, joint pain, stiff joints, neck pain, back pain  or bone pain.    Neuropsychiatric:  There is no history of migraines or severe headaches, seizure/epilepsy, speech problems, coordination problems, trembling/tremors, fainting/black outs, dizziness, memory problems, loss of sensation/numbness, problems walking, weakness, tingling or burning in hands/feet. There is no history of abusive relationship, bipolar disorder, sleep disturbance, anxiety, depression or feeling of despair.    Endocrine:    There is no history of poor/slow wound healing, weight loss/gain, fertility or hormone problems, cold intolerance, thyroid disease.     Allergic/Immunologic:  There is no history of hives, hay fever, angioedema or anaphylaxis.    /80 (BP Location: Left arm, Patient Position: Sitting, Cuff Size: adult)   Pulse 91   Temp 97 °F (36.1 °C) (Temporal)   Resp 16   Ht 1.651 m (5' 5\")   Wt 83.9 kg (185 lb)   SpO2 94%   BMI 30.79 kg/m²     Physical Exam:  The patient is an alert, oriented, well-nourished and  well-developed woman who appears her stated age. Her speech patterns and movements are normal. Her affect is appropriate.    HEENT: The head is normocephalic. The neck is supple. The thyroid is not enlarged and is without palpable masses/nodules. There are no palpable masses. The trachea is in the midline. Conjunctiva are clear, non-icteric.    Chest: The chest expands symmetrically. The lungs are clear to auscultation.    Heart: The rhythm is regular.  There are no murmurs, rubs, gallops or thrills.    Breasts:  Her breasts are symmetrical with a cup size 38D.  Right breast: The skin, nipple ,and areola appear normal. There is no skin dimpling with movement of the pectoralis. There is no nipple retraction. No nipple discharge can be elicited. The parenchyma is mildly nodular. There are no dominant masses in the breast. The axillary tail is normal.  Left breast:   The skin, nipple, and areola appear normal. There is no skin dimpling with movement of the pectoralis.  There is no nipple retraction. No nipple discharge can be elicited. The parenchyma is mildly nodular. There are no dominant masses in the breast. The axillary tail is normal.    Abdomen:  The abdomen is soft, flat and non tender. The liver is not enlarged. There are no palpable masses.    Lymph Nodes:  The supraclavicular, axillary and cervical regions are free of significant lymphadenopathy.    Back: There is no vertebral column tenderness.    Skin: The skin appears normal. There are no suspicious appearing rashes or lesions.    Extremities: The extremities are without deformity, cyanosis or edema.    Impression:   Ms. Perez Narvaez is a 65 year old woman presents with left breast DCIS, clinical stage Tis NxMx.    Assessment and plan: The patient is healing well since the biopsy with no other clinical findings bilaterally.  I personally reviewed the recent imaging and pathology and we discussed this at length.    The natural history and evolution of DCIS was discussed with Ms. Perez Narvaez and her family. This  included the difference between in-situ and invasive carcinoma, and the distinction between  local and systemic disease and local and systemic therapy. For local treatment options, I  explained the risks and benefits of breast conservation and mastectomy (with or without  reconstruction), including the fact that survival rates are essentially equal with these two  approaches. If breast conservation is elected, I explained the need for free margins, the  possibility of re-excision to achieve free margins, and the need for post-operative radiotherapy.  The patient was told that diego staging is not usually required for DCIS, but is sometimes  recommended depending on the size and grade of the lesion, and if mastectomy is planned  for treatment. The reasons for this were explained. I explained that for pure DCIS, systemic  therapy is not required, although endocrine agents such as tamoxifen can be used in  women  with hormone sensitive disease in order to reduce the risk of local recurrence and future new  primaries  Following this discussion, where all of the patient's questions were answered, we agreed to  proceed with left breast wire localized lumpectomy.   The risks and possible complications of the procedure were explained to the patient and her family and she understood and agreed to the proposed plan. She was given ample opportunity for questions and those questions were answered to her satisfaction. She has been  encouraged to contact the office with any questions or concerns prior to her next appointment.       This note was created by [employer] voice recognition. Errors in content may be related to improper recognition by the system; efforts to review and correct have been done but errors may still exist. Please be advised the primary purpose of this note is for me to communicate medical care. Standard sentence structure is not always used. Medical terminology and medical abbreviations may be used. There may be grammatical, typographical, and automated fill ins that may have errors missed in proofreading.           [1]   Past Medical History:   Diabetes (HCC)    Hyperlipidemia   [2]   Past Surgical History:  Procedure Laterality Date          Colonoscopy N/A 2020    Procedure: COLONOSCOPY;  Surgeon: Crow Moran MD;  Location: [facility] ENDOSCOPY    Other surgical history      varicose vein  surgery     Tonsillectomy     [3]   No outpatient medications have been marked as taking for the 25 encounter (Appointment) with Lauren Hernandez MD.   [4] No Known Allergies  [5]   Family History  Problem Relation Age of Onset    Other (renal failure) Mother 86    Cancer Father 67        Bladder    Breast Cancer Sister 73

## 2025-04-23 NOTE — TELEPHONE ENCOUNTER
[facility] faxed over Medical Clearance paperwork to be completed.    Pre Op appointment is set for 5/19/2025    Surgery date to be confirmed   tentatively set for 3rd week in may     Paperwork placed in Dr Lira's mailbox

## 2025-04-23 NOTE — PATIENT INSTRUCTIONS
Dr. Lauren Hernandez  Tel: [phone]  Fax: [phone] [facility]  [address] [place] [address]  [phone]     Surgery/Procedure: Left breast wire localized lumpectomy     Anesthesia:   MAC  Surgery Length:   45 minutes CPT:  61986   Wire LOC:   Yes Nuc Med:   No   Maggie Seed:  No       Dx & ICD-10: Breast neoplasm, Tis (DCIS), left (D05.12)   Radiology Instructions: Left breast, 12 o'clock position, M shaped clip, biopsy demonstrates ductal carcinoma in situ.      _______________________________________________________________________________    Someone must accompany you the day of the procedure to drive you home safely, because of anesthesia.  You will need an adult  to stay with you the first night following your surgery.  You must remove any kind of makeup, acrylic nails, lotions, powders, creams or deodorant.  [facility] ONLY: Pre-admission will give instruct you on when to take Gatorade and Tylenol/acetaminophen prior to your surgery, purchase 2 - 12oz bottles of regular Gatorade (NOT RED/SUGAR FREE). Otherwise, you may not eat or drink anything else after 11PM the night before surgery.    [facility]: You may not eat or drink anything after midnight the day of your surgery.   Wear comfortable clothing that can be easily removed.  If you wear dentures, contacts lenses, or any prosthesis, you will be asked to remove them.  Do not drink alcohol or smoke 24 hours prior to your procedure.  Bring a picture ID and your insurance card.  Covid-19 testing is no longer required before surgery unless you are experiencing symptoms such as fever, cough, congestion, etc.   The Pre-Admission Testing Department will call the day before to confirm your procedure, give you the time you need to arrive by and directions on where to go. They begin making calls after 2pm, if you are not contacted by 4pm, please call the surgeon's office listed above.  Do not take any blood thinners at  least one week prior to the procedure/surgery. This includes aspirin, baby aspirin, Ibuprofen products, herbal supplements, diet medications, vitamin E, fish oil and green tea supplements. Please check other supplements for these ingredients. *TYLENOL or acetaminophen is acceptable*  If you take Coumadin, Plavix, Xarelto, or Eliquis, please contact your prescribing physician for special instructions on how long to hold. If you take insulin contact your primary care physician for special instructions.  Our surgery scheduler, Rose, will be contacting you to discuss surgery dates. If you have any questions related to scheduling your surgery, please reach out to her at [phone].  _____________________________________________________________________  PRE-OPERATIVE TESTING IF INDICATED BELOW  PLEASE COMPLETE ASAP (AT LEAST 14-21 DAYS PRIOR TO SURGERY)  [] CBC [x] BMP [] CMP [x] EKG    [] PT, PTT, INR [] Cardiac Clearance  [x] H&P Medical Clearance [] Chest X-ray     Please call Central Scheduling to schedule an appointment for pre-operative labs/tests once your orders are placed @ [phone]  Does the patient have a pacemaker or ICD?                              Faxitron/Trident in OR?  [] Yes   [x] No                               [] Yes   [x] No

## 2025-05-01 PROBLEM — D05.12 BREAST NEOPLASM, TIS (DCIS), LEFT: Status: ACTIVE | Noted: 2025-05-01

## 2025-05-20 ENCOUNTER — LAB ENCOUNTER (OUTPATIENT)
Dept: LAB | Age: 65
End: 2025-05-20
Attending: INTERNAL MEDICINE
Payer: MEDICARE

## 2025-05-20 ENCOUNTER — OFFICE VISIT (OUTPATIENT)
Dept: INTERNAL MEDICINE CLINIC | Facility: CLINIC | Age: 65
End: 2025-05-20
Payer: MEDICARE

## 2025-05-20 VITALS
WEIGHT: 180 LBS | HEART RATE: 82 BPM | OXYGEN SATURATION: 94 % | SYSTOLIC BLOOD PRESSURE: 136 MMHG | HEIGHT: 65 IN | DIASTOLIC BLOOD PRESSURE: 78 MMHG | BODY MASS INDEX: 29.99 KG/M2

## 2025-05-20 DIAGNOSIS — E11.9 TYPE 2 DIABETES MELLITUS WITHOUT COMPLICATION, WITHOUT LONG-TERM CURRENT USE OF INSULIN (HCC): ICD-10-CM

## 2025-05-20 DIAGNOSIS — Z01.818 PREOPERATIVE EXAMINATION: Primary | ICD-10-CM

## 2025-05-20 DIAGNOSIS — E78.5 DYSLIPIDEMIA: ICD-10-CM

## 2025-05-20 LAB
ALBUMIN SERPL-MCNC: 4.4 G/DL (ref 3.2–4.8)
ALBUMIN/GLOB SERPL: 2 {RATIO} (ref 1–2)
ALP LIVER SERPL-CCNC: 66 U/L (ref 55–142)
ALT SERPL-CCNC: 15 U/L (ref 10–49)
ANION GAP SERPL CALC-SCNC: 6 MMOL/L (ref 0–18)
AST SERPL-CCNC: 15 U/L (ref ?–34)
BILIRUB SERPL-MCNC: 0.5 MG/DL (ref 0.2–1.1)
BUN BLD-MCNC: 16 MG/DL (ref 9–23)
BUN/CREAT SERPL: 23.9 (ref 10–20)
CALCIUM BLD-MCNC: 9.4 MG/DL (ref 8.7–10.4)
CHLORIDE SERPL-SCNC: 105 MMOL/L (ref 98–112)
CHOLEST SERPL-MCNC: 162 MG/DL (ref ?–200)
CO2 SERPL-SCNC: 30 MMOL/L (ref 21–32)
CREAT BLD-MCNC: 0.67 MG/DL (ref 0.55–1.02)
EGFRCR SERPLBLD CKD-EPI 2021: 96 ML/MIN/1.73M2 (ref 60–?)
EST. AVERAGE GLUCOSE BLD GHB EST-MCNC: 154 MG/DL (ref 68–126)
FASTING PATIENT LIPID ANSWER: YES
FASTING STATUS PATIENT QL REPORTED: YES
GLOBULIN PLAS-MCNC: 2.2 G/DL (ref 2–3.5)
GLUCOSE BLD-MCNC: 122 MG/DL (ref 70–99)
HBA1C MFR BLD: 7 % (ref ?–5.7)
HDLC SERPL-MCNC: 62 MG/DL (ref 40–59)
LDLC SERPL CALC-MCNC: 83 MG/DL (ref ?–100)
NONHDLC SERPL-MCNC: 100 MG/DL (ref ?–130)
OSMOLALITY SERPL CALC.SUM OF ELEC: 294 MOSM/KG (ref 275–295)
POTASSIUM SERPL-SCNC: 4.1 MMOL/L (ref 3.5–5.1)
PROT SERPL-MCNC: 6.6 G/DL (ref 5.7–8.2)
SODIUM SERPL-SCNC: 141 MMOL/L (ref 136–145)
TRIGL SERPL-MCNC: 94 MG/DL (ref 30–149)
VLDLC SERPL CALC-MCNC: 15 MG/DL (ref 0–30)

## 2025-05-20 PROCEDURE — 83036 HEMOGLOBIN GLYCOSYLATED A1C: CPT

## 2025-05-20 PROCEDURE — 93000 ELECTROCARDIOGRAM COMPLETE: CPT | Performed by: INTERNAL MEDICINE

## 2025-05-20 PROCEDURE — 80061 LIPID PANEL: CPT

## 2025-05-20 PROCEDURE — 99214 OFFICE O/P EST MOD 30 MIN: CPT | Performed by: INTERNAL MEDICINE

## 2025-05-20 PROCEDURE — 36415 COLL VENOUS BLD VENIPUNCTURE: CPT

## 2025-05-20 PROCEDURE — G2211 COMPLEX E/M VISIT ADD ON: HCPCS | Performed by: INTERNAL MEDICINE

## 2025-05-20 PROCEDURE — 80053 COMPREHEN METABOLIC PANEL: CPT

## 2025-05-20 RX ORDER — METFORMIN HYDROCHLORIDE 500 MG/1
TABLET, EXTENDED RELEASE ORAL
Qty: 270 TABLET | Refills: 2 | Status: SHIPPED | OUTPATIENT
Start: 2025-05-20

## 2025-05-20 NOTE — PROGRESS NOTES
Perez Narvaez is a 65 year old female.  Chief Complaint   Patient presents with    Pre-Op Exam     Left breast wire localized lumpectomy Dr. Hernandez 25       HPI:   Perez Narvaez is a 65 year old female who presents for: preoperative examination for L breast lumpectomy with Dr. Lauren Hernandez on 2025.     She had an MRI of the breast 2025 demonstrated 2 x 1 x 1.5cm area of enchancement in the L breast. Biopsy confirmed ductal carcinoma in situ, ER/MD +. She is now scheduled for surgery.       PMH: type 2 DM, dyslipidemia, osteoarthritis of the knee  PSH: , breast reduction, tonsillectomy, vein stripping       No prior reactions to anesthesia  NKDA    No cp or sob, able to walk up 2 flights of stairs    Wt Readings from Last 6 Encounters:   25 180 lb (81.6 kg)   25 185 lb (83.9 kg)   24 180 lb (81.6 kg)   24 180 lb (81.6 kg)   10/30/23 187 lb (84.8 kg)   10/05/22 180 lb (81.6 kg)     Body mass index is 29.95 kg/m².       Current Medications[1]   Past Medical History[2]   Past Surgical History[3]   Family History[4]   Social History:   Short Social Hx on File[5]       REVIEW OF SYSTEMS:   GENERAL: feels well otherwise  SKIN: denies any unusual skin lesions  HEENT: denies nasal congestion, sinus pain, ST, sore throat, ear pain  LUNGS: denies shortness of breath with exertion, wheezing, cough, or sputum production  CARDIOVASCULAR: denies chest pain, pressure, or palpitations      EXAM:   /78   Pulse 82   Ht 5' 5\" (1.651 m)   Wt 180 lb (81.6 kg)   SpO2 94%   BMI 29.95 kg/m²     GENERAL: well developed, well nourished, in no apparent distress  HEENT: normal oropharynx without erythema or exudate, normal TM's, no sinus tenderness, nares patent  EYES: PERRLA, EOMI, conjunctivae are pink  NECK: supple, no carotic bruits, no thyromegaly, no cervical or supraclavicular LAD  LUNGS: clear to auscultation bilaterally, no wheezing or rhonchi  CARDIO: RRR, normal S1S2, no  gallops or murmurs  Abd: soft, NT, ND, NABS  VASC: +2 femoral, DP and PT pulses bilaterally.       ASSESSMENT AND PLAN:       Preoperative examination   L breast lumpectomy on 2025 with Dr. Lauren Hernandez.   Perez Narvaez is in excellent clinical condition; she is able to perform 4 METs activity without difficulty. Her EKG demonstrates sinus rhythm, her blood work is within the acceptable ranges. She is deemed low risk for procedure.     varicose veins  Wear 15-20mmhg compression during daytime  -seeing specialist    Type 2 DM  Last A1c value was 7% done 2025.  Advised to hold metformin for the 24 hours prior to surgery; can resume the morning after surgery.     Dyslipidemia  lipitor 10mg daily    R knee Pain  Likely from OA. Well controlled at this time  - Tylenol 650 mg q4-6 hr PRN        Emili Lira DO  2025  6:15 AM         [1]   Current Outpatient Medications   Medication Sig Dispense Refill    metFORMIN  MG Oral Tablet 24 Hr TAKE 1 TABLET BY MOUTH WITH BREAKFAST AND 2 TABLETS WITH DINNER 270 tablet 2    atorvastatin (LIPITOR) 20 MG Oral Tab Take 1 tablet (20 mg total) by mouth daily. 90 tablet 3   [2]   Past Medical History:   Diabetes (HCC)    Hyperlipidemia   [3]   Past Surgical History:  Procedure Laterality Date          Colonoscopy N/A 2020    Procedure: COLONOSCOPY;  Surgeon: Crow Moran MD;  Location: [facility] ENDOSCOPY    Other surgical history      varicose vein  surgery     Tonsillectomy     [4]   Family History  Problem Relation Age of Onset    Other (renal failure) Mother 86    Cancer Father 67        Bladder    Breast Cancer Sister 73   [5]   Social History  Socioeconomic History    Marital status:    Tobacco Use    Smoking status: Never    Smokeless tobacco: Never   Vaping Use    Vaping status: Never Used   Substance and Sexual Activity    Alcohol use: Yes     Comment: Social    Drug use: No

## 2025-05-22 ENCOUNTER — TELEPHONE (OUTPATIENT)
Dept: INTERNAL MEDICINE CLINIC | Facility: CLINIC | Age: 65
End: 2025-05-22

## 2025-05-22 LAB
ATRIAL RATE: 67 BPM
P AXIS: 43 DEGREES
P-R INTERVAL: 138 MS
Q-T INTERVAL: 398 MS
QRS DURATION: 84 MS
QTC CALCULATION (BEZET): 420 MS
R AXIS: 13 DEGREES
T AXIS: 38 DEGREES
VENTRICULAR RATE: 67 BPM

## 2025-05-22 NOTE — TELEPHONE ENCOUNTER
Copy of 5/20/2025 office visit note, EKG, and lab results faxed to  at [phone], confirmation received.

## 2025-06-02 ENCOUNTER — ANESTHESIA (OUTPATIENT)
Dept: SURGERY | Facility: HOSPITAL | Age: 65
End: 2025-06-02
Payer: MEDICARE

## 2025-06-02 ENCOUNTER — HOSPITAL ENCOUNTER (OUTPATIENT)
Facility: HOSPITAL | Age: 65
Setting detail: HOSPITAL OUTPATIENT SURGERY
Discharge: HOME | End: 2025-06-02
Attending: SURGERY | Admitting: SURGERY
Payer: MEDICARE

## 2025-06-02 ENCOUNTER — APPOINTMENT (OUTPATIENT)
Dept: MAMMOGRAPHY | Facility: HOSPITAL | Age: 65
End: 2025-06-02
Attending: SURGERY
Payer: MEDICARE

## 2025-06-02 ENCOUNTER — HOSPITAL ENCOUNTER (OUTPATIENT)
Dept: MAMMOGRAPHY | Facility: HOSPITAL | Age: 65
Discharge: HOME | End: 2025-06-02
Attending: SURGERY
Payer: MEDICARE

## 2025-06-02 ENCOUNTER — ANESTHESIA EVENT (OUTPATIENT)
Dept: SURGERY | Facility: HOSPITAL | Age: 65
End: 2025-06-02
Payer: MEDICARE

## 2025-06-02 VITALS
BODY MASS INDEX: 29.66 KG/M2 | WEIGHT: 178 LBS | HEIGHT: 65 IN | OXYGEN SATURATION: 96 % | RESPIRATION RATE: 16 BRPM | DIASTOLIC BLOOD PRESSURE: 84 MMHG | HEART RATE: 58 BPM | SYSTOLIC BLOOD PRESSURE: 119 MMHG | TEMPERATURE: 97 F

## 2025-06-02 DIAGNOSIS — D05.12 BREAST NEOPLASM, TIS (DCIS), LEFT: Primary | ICD-10-CM

## 2025-06-02 DIAGNOSIS — D05.12 BREAST NEOPLASM, TIS (DCIS), LEFT: ICD-10-CM

## 2025-06-02 LAB — GLUCOSE BLDC GLUCOMTR-MCNC: 152 MG/DL (ref 70–99)

## 2025-06-02 PROCEDURE — 82962 GLUCOSE BLOOD TEST: CPT

## 2025-06-02 PROCEDURE — 19281 PERQ DEVICE BREAST 1ST IMAG: CPT | Performed by: SURGERY

## 2025-06-02 PROCEDURE — 88307 TISSUE EXAM BY PATHOLOGIST: CPT | Performed by: SURGERY

## 2025-06-02 PROCEDURE — 88300 SURGICAL PATH GROSS: CPT | Performed by: SURGERY

## 2025-06-02 PROCEDURE — 88360 TUMOR IMMUNOHISTOCHEM/MANUAL: CPT | Performed by: SURGERY

## 2025-06-02 PROCEDURE — 76098 X-RAY EXAM SURGICAL SPECIMEN: CPT | Performed by: SURGERY

## 2025-06-02 RX ORDER — HYDROCODONE BITARTRATE AND ACETAMINOPHEN 5; 325 MG/1; MG/1
1-2 TABLET ORAL EVERY 6 HOURS PRN
Qty: 20 TABLET | Refills: 0 | Status: SHIPPED | OUTPATIENT
Start: 2025-06-02

## 2025-06-02 RX ORDER — LIDOCAINE HYDROCHLORIDE 10 MG/ML
INJECTION, SOLUTION EPIDURAL; INFILTRATION; INTRACAUDAL; PERINEURAL AS NEEDED
Status: DISCONTINUED | OUTPATIENT
Start: 2025-06-02 | End: 2025-06-02 | Stop reason: SURG

## 2025-06-02 RX ORDER — MORPHINE SULFATE 4 MG/ML
4 INJECTION, SOLUTION INTRAMUSCULAR; INTRAVENOUS EVERY 10 MIN PRN
Status: DISCONTINUED | OUTPATIENT
Start: 2025-06-02 | End: 2025-06-02

## 2025-06-02 RX ORDER — HYDROMORPHONE HYDROCHLORIDE 1 MG/ML
0.4 INJECTION, SOLUTION INTRAMUSCULAR; INTRAVENOUS; SUBCUTANEOUS EVERY 5 MIN PRN
Status: DISCONTINUED | OUTPATIENT
Start: 2025-06-02 | End: 2025-06-02

## 2025-06-02 RX ORDER — BUPIVACAINE HYDROCHLORIDE 5 MG/ML
INJECTION, SOLUTION EPIDURAL; INTRACAUDAL; PERINEURAL AS NEEDED
Status: DISCONTINUED | OUTPATIENT
Start: 2025-06-02 | End: 2025-06-02 | Stop reason: HOSPADM

## 2025-06-02 RX ORDER — DEXTROSE MONOHYDRATE 25 G/50ML
50 INJECTION, SOLUTION INTRAVENOUS
Status: DISCONTINUED | OUTPATIENT
Start: 2025-06-02 | End: 2025-06-02

## 2025-06-02 RX ORDER — ONDANSETRON 2 MG/ML
4 INJECTION INTRAMUSCULAR; INTRAVENOUS EVERY 6 HOURS PRN
Status: DISCONTINUED | OUTPATIENT
Start: 2025-06-02 | End: 2025-06-02

## 2025-06-02 RX ORDER — MORPHINE SULFATE 4 MG/ML
2 INJECTION, SOLUTION INTRAMUSCULAR; INTRAVENOUS EVERY 10 MIN PRN
Status: DISCONTINUED | OUTPATIENT
Start: 2025-06-02 | End: 2025-06-02

## 2025-06-02 RX ORDER — MIDAZOLAM HYDROCHLORIDE 1 MG/ML
INJECTION INTRAMUSCULAR; INTRAVENOUS AS NEEDED
Status: DISCONTINUED | OUTPATIENT
Start: 2025-06-02 | End: 2025-06-02 | Stop reason: SURG

## 2025-06-02 RX ORDER — NICOTINE POLACRILEX 4 MG
30 LOZENGE BUCCAL
Status: DISCONTINUED | OUTPATIENT
Start: 2025-06-02 | End: 2025-06-02

## 2025-06-02 RX ORDER — ACETAMINOPHEN 500 MG
1000 TABLET ORAL ONCE
Status: COMPLETED | OUTPATIENT
Start: 2025-06-02 | End: 2025-06-02

## 2025-06-02 RX ORDER — HYDROMORPHONE HYDROCHLORIDE 1 MG/ML
0.2 INJECTION, SOLUTION INTRAMUSCULAR; INTRAVENOUS; SUBCUTANEOUS EVERY 5 MIN PRN
Status: DISCONTINUED | OUTPATIENT
Start: 2025-06-02 | End: 2025-06-02

## 2025-06-02 RX ORDER — SODIUM CHLORIDE, SODIUM LACTATE, POTASSIUM CHLORIDE, CALCIUM CHLORIDE 600; 310; 30; 20 MG/100ML; MG/100ML; MG/100ML; MG/100ML
INJECTION, SOLUTION INTRAVENOUS CONTINUOUS
Status: DISCONTINUED | OUTPATIENT
Start: 2025-06-02 | End: 2025-06-02

## 2025-06-02 RX ORDER — NALOXONE HYDROCHLORIDE 0.4 MG/ML
0.08 INJECTION, SOLUTION INTRAMUSCULAR; INTRAVENOUS; SUBCUTANEOUS AS NEEDED
Status: DISCONTINUED | OUTPATIENT
Start: 2025-06-02 | End: 2025-06-02

## 2025-06-02 RX ORDER — NICOTINE POLACRILEX 4 MG
15 LOZENGE BUCCAL
Status: DISCONTINUED | OUTPATIENT
Start: 2025-06-02 | End: 2025-06-02

## 2025-06-02 RX ORDER — LIDOCAINE HYDROCHLORIDE AND EPINEPHRINE 10; 10 MG/ML; UG/ML
INJECTION, SOLUTION INFILTRATION; PERINEURAL AS NEEDED
Status: DISCONTINUED | OUTPATIENT
Start: 2025-06-02 | End: 2025-06-02 | Stop reason: HOSPADM

## 2025-06-02 RX ORDER — METOCLOPRAMIDE HYDROCHLORIDE 5 MG/ML
10 INJECTION INTRAMUSCULAR; INTRAVENOUS EVERY 8 HOURS PRN
Status: DISCONTINUED | OUTPATIENT
Start: 2025-06-02 | End: 2025-06-02

## 2025-06-02 RX ADMIN — MIDAZOLAM HYDROCHLORIDE 2 MG: 1 INJECTION INTRAMUSCULAR; INTRAVENOUS at 08:40:00

## 2025-06-02 RX ADMIN — SODIUM CHLORIDE, SODIUM LACTATE, POTASSIUM CHLORIDE, CALCIUM CHLORIDE: 600; 310; 30; 20 INJECTION, SOLUTION INTRAVENOUS at 08:40:00

## 2025-06-02 RX ADMIN — LIDOCAINE HYDROCHLORIDE 50 MG: 10 INJECTION, SOLUTION EPIDURAL; INFILTRATION; INTRACAUDAL; PERINEURAL at 08:43:00

## 2025-06-02 RX ADMIN — SODIUM CHLORIDE, SODIUM LACTATE, POTASSIUM CHLORIDE, CALCIUM CHLORIDE: 600; 310; 30; 20 INJECTION, SOLUTION INTRAVENOUS at 09:31:00

## 2025-06-02 RX ADMIN — SODIUM CHLORIDE, SODIUM LACTATE, POTASSIUM CHLORIDE, CALCIUM CHLORIDE: 600; 310; 30; 20 INJECTION, SOLUTION INTRAVENOUS at 08:52:00

## 2025-06-02 NOTE — BRIEF OP NOTE
Pre-Operative Diagnosis: Breast neoplasm, Tis (DCIS), left [D05.12]     Post-Operative Diagnosis: Breast neoplasm, Tis (DCIS), left [D05.12]      Procedure Performed:   Left breast wire localized lumpectomy    Surgeons and Role:     * Lauren Hernandez MD - Primary    Assistant(s):  Surgical Assistant.: Lexie Carter     Surgical Findings: Clip visualized on specimen radiogram     Specimen: Left lumpectomy, L margins x6     Estimated Blood Loss: 5cc    Lauren Hernandez MD  6/2/2025  9:18 AM

## 2025-06-02 NOTE — H&P
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  She denies any palpable masses, nipple discharge, skin changes or axillary symptoms.  She does have a family history of breast cancer.  She has no personal prior history of breast disease or biopsies.  She had a routine screening mammogram on 2025 that showed a new asymmetry in the left breast.  She had a left diagnostic evaluation on 2025 that confirmed this to partially dissipate with compression and therefore an MRI was recommended.  The MRI took place on 2025 and she was found to have an avid 2.1 x 1.2 x 1.5 cm area of enhancement at the 12 o'clock position of the left breast for which a biopsy was recommended with no suspicious axillary adenopathy.  Her biopsy took place on 2025 confirming ductal carcinoma in situ measuring up to 4 mm that was ER/KS positive.She is here today for evaluation and recommendations for further therapy.        [Past Medical History]    [Past Medical History]   Diabetes (HCC)    Hyperlipidemia        [Past Surgical History]    [Past Surgical History]        Procedure Laterality Date           Colonoscopy N/A 2020     Procedure: COLONOSCOPY;  Surgeon: Crow Moran MD;  Location: [facility] [place] ENDOSCOPY    Other surgical history        varicose vein  surgery     Tonsillectomy            Gynecological History:  Pt is a   Pt was 26 years old at time of first pregnancy.    She denies any cumulative breastfeeding history   She achieved menarche at age unknown and LMP unknown  Age of Menopause:   Type:   She denies any history of hormone replacement therapy  She has history of oral contraceptive use for 1 years, last in 1970s.  She denies infertility treatment to achieve pregnancy.     Medications:    [Current Medications]    [Current Medications]  No outpatient medications have been marked as  taking for the 4/23/25 encounter (Appointment) with Lauren Hernandez MD.        Allergies:    [Allergies]    [Allergies]  No Known Allergies     Family History:   [Family History]    [Family History]        Problem Relation Age of Onset    Other (renal failure) Mother 86    Cancer Father 67         Bladder    Breast Cancer Sister 73        She is not of Ashkenazi Anabaptist ancestry.     Social History:       History   Alcohol Use    Yes       Comment: Social             History   Smoking Status    Never   Smokeless Tobacco    Never      Ms. Perez Narvaez is  with 4 children. She has 2 siblings. She is currently Employed part-time     Review of Systems:  General:   The patient denies, fever, chills, night sweats, fatigue, generalized weakness, change in appetite or weight loss.     HEENT:     The patient denies eye irritation, cataracts, redness, glaucoma, yellowing of the eyes, change in vision, color blindness, or +wearing contacts/glasses. The patient denies hearing loss, ringing in the ears, ear drainage, earaches, nasal congestion, nose bleeds, snoring, pain in mouth/throat, hoarseness, change in voice, facial trauma.     Respiratory:  The patient denies chronic cough, phlegm, hemoptysis, pleurisy/chest pain, pneumonia, asthma, wheezing, difficulty in breathing with exertion, emphysema, chronic bronchitis, shortness of breath or abnormal sound when breathing.      Cardiovascular:  There is no history of chest pain, chest pressure/discomfort, palpitations, irregular heartbeat, fainting or near-fainting, difficulty breathing when lying flat, SOB/Coughing at night, swelling of the legs or chest pain while walking.     Breasts:  See history of present illness     Gastrointestinal:     There is no history of difficulty or pain with swallowing, reflux symptoms, vomiting, dark or bloody stools, constipation, yellowing of the skin, indigestion, nausea, change in bowel habits, diarrhea, abdominal pain or vomiting  blood.      Genitourinary:  The patient denies frequent urination, needing to get up at night to urinate, urinary hesitancy or retaining urine, painful urination, urinary incontinence, decreased urine stream, blood in the urine or vaginal/penile discharge.     Skin:    The patient denies rash, itching, skin lesions, dry skin, change in skin color or change in moles.      Hematologic/Lymphatic:  The patient denies easily bruising or bleeding or persistent swollen glands or lymph nodes.      Musculoskeletal:  The patient denies muscle aches/pain, joint pain, stiff joints, neck pain, back pain or bone pain.     Neuropsychiatric:  There is no history of migraines or severe headaches, seizure/epilepsy, speech problems, coordination problems, trembling/tremors, fainting/black outs, dizziness, memory problems, loss of sensation/numbness, problems walking, weakness, tingling or burning in hands/feet. There is no history of abusive relationship, bipolar disorder, sleep disturbance, anxiety, depression or feeling of despair.     Endocrine:    There is no history of poor/slow wound healing, weight loss/gain, fertility or hormone problems, cold intolerance, thyroid disease.      Allergic/Immunologic:  There is no history of hives, hay fever, angioedema or anaphylaxis.     /80 (BP Location: Left arm, Patient Position: Sitting, Cuff Size: adult)   Pulse 91   Temp 97 °F (36.1 °C) (Temporal)   Resp 16   Ht 1.651 m (5' 5\")   Wt 83.9 kg (185 lb)   SpO2 94%   BMI 30.79 kg/m²      Physical Exam:  The patient is an alert, oriented, well-nourished and  well-developed woman who appears her stated age. Her speech patterns and movements are normal. Her affect is appropriate.     HEENT: The head is normocephalic. The neck is supple. The thyroid is not enlarged and is without palpable masses/nodules. There are no palpable masses. The trachea is in the midline. Conjunctiva are clear, non-icteric.     Chest: The chest expands  symmetrically. The lungs are clear to auscultation.     Heart: The rhythm is regular.  There are no murmurs, rubs, gallops or thrills.     Breasts:  Her breasts are symmetrical with a cup size 38D.  Right breast: The skin, nipple ,and areola appear normal. There is no skin dimpling with movement of the pectoralis. There is no nipple retraction. No nipple discharge can be elicited. The parenchyma is mildly nodular. There are no dominant masses in the breast. The axillary tail is normal.  Left breast:   The skin, nipple, and areola appear normal. There is no skin dimpling with movement of the pectoralis. There is no nipple retraction. No nipple discharge can be elicited. The parenchyma is mildly nodular. There are no dominant masses in the breast. The axillary tail is normal.     Abdomen:  The abdomen is soft, flat and non tender. The liver is not enlarged. There are no palpable masses.     Lymph Nodes:  The supraclavicular, axillary and cervical regions are free of significant lymphadenopathy.     Back: There is no vertebral column tenderness.     Skin: The skin appears normal. There are no suspicious appearing rashes or lesions.     Extremities: The extremities are without deformity, cyanosis or edema.     Impression:   Ms. Perez Nravaez is a 65 year old woman presents with left breast DCIS, clinical stage Tis NxMx.     Assessment and plan: The patient is healing well since the biopsy with no other clinical findings bilaterally.  I personally reviewed the recent imaging and pathology and we discussed this at length.     The natural history and evolution of DCIS was discussed with Ms. Perez Narvaez and her family. This  included the difference between in-situ and invasive carcinoma, and the distinction between  local and systemic disease and local and systemic therapy. For local treatment options, I  explained the risks and benefits of breast conservation and mastectomy (with or without  reconstruction), including  the fact that survival rates are essentially equal with these two  approaches. If breast conservation is elected, I explained the need for free margins, the  possibility of re-excision to achieve free margins, and the need for post-operative radiotherapy.  The patient was told that diego staging is not usually required for DCIS, but is sometimes  recommended depending on the size and grade of the lesion, and if mastectomy is planned  for treatment. The reasons for this were explained. I explained that for pure DCIS, systemic  therapy is not required, although endocrine agents such as tamoxifen can be used in women  with hormone sensitive disease in order to reduce the risk of local recurrence and future new  primaries  Following this discussion, where all of the patient's questions were answered, we agreed to  proceed with left breast wire localized lumpectomy.   The risks and possible complications of the procedure were explained to the patient and her family and she understood and agreed to the proposed plan. She was given ample opportunity for questions and those questions were answered to her satisfaction. She has been  encouraged to contact the office with any questions or concerns prior to her next appointment.         This note was created by Dragon voice recognition. Errors in content may be related to improper recognition by the system; efforts to review and correct have been done but errors may still exist. Please be advised the primary purpose of this note is for me to communicate medical care. Standard sentence structure is not always used. Medical terminology and medical abbreviations may be used. There may be grammatical, typographical, and automated fill ins that may have errors missed in proofreading.    Pre-op Diagnosis: Breast neoplasm, Tis (DCIS), left [D05.12]    The above referenced H&P was reviewed by Lauren Hernandez MD on 6/2/2025, the patient was examined and no significant changes have  occurred in the patient's condition since the H&P was performed.  I discussed with the patient and/or legal representative the potential benefits, risks and side effects of this procedure; the likelihood of the patient achieving goals; and potential problems that might occur during recuperation.  I discussed reasonable alternatives to the procedure, including risks, benefits and side effects related to the alternatives and risks related to not receiving this procedure.  We will proceed with procedure as planned.

## 2025-06-02 NOTE — ANESTHESIA POSTPROCEDURE EVALUATION
Patient: Perez Narvaez    Procedure Summary       Date: 06/02/25 Room / Location: [facility] MAIN OR 08 / [facility] MAIN OR    Anesthesia Start: 0839 Anesthesia Stop: 0934    Procedures:       Left breast wire localized lumpectomy (Left: Breast)      BREAST BIOPSY NEEDLE LOCALIZATION (Left: Breast) Diagnosis:       Breast neoplasm, Tis (DCIS), left      (Breast neoplasm, Tis (DCIS), left [D05.12])    Surgeons: Lauren Hernandez MD Anesthesiologist: Joshua Crowe DO    Anesthesia Type: MAC ASA Status: 2            Anesthesia Type: MAC    Vitals Value Taken Time   BP 97/75 06/02/25 09:36   Temp 97.2 °F (36.2 °C) 06/02/25 09:34   Pulse 65 06/02/25 09:36   Resp 14 06/02/25 09:36   SpO2 89 % 06/02/25 09:36   Vitals shown include unfiled device data.    EM AN Post Evaluation:   Patient Evaluated in PACU  Patient Participation: complete - patient participated  Level of Consciousness: awake  Pain Management: adequate  Airway Patency:patent  Dental exam unchanged from preop  Yes    Nausea/Vomiting: none  Cardiovascular Status: acceptable  Respiratory Status: acceptable and room air  Postoperative Hydration acceptable      JOSHUA CROWE DO  6/2/2025 9:37 AM

## 2025-06-02 NOTE — ANESTHESIA PREPROCEDURE EVALUATION
Anesthesia PreOp Note    HPI:     Perez Narvaez is a 65 year old female who presents for preoperative consultation requested by: Lauren Hernandez MD    Date of Surgery: 6/2/2025    Procedure(s):  Left breast wire localized lumpectomy  BREAST BIOPSY NEEDLE LOCALIZATION  Indication: Breast neoplasm, Tis (DCIS), left [D05.12]    Relevant Problems   No relevant active problems       NPO:  Last Liquid Consumption Date: 06/01/25  Last Liquid Consumption Time: 1900  Last Solid Consumption Date: 06/01/25  Last Solid Consumption Time: 1900  Last Liquid Consumption Date: 06/01/25          History Review:  Patient Active Problem List    Diagnosis Date Noted    Breast neoplasm, Tis (DCIS), left 05/01/2025       Past Medical History[1]    Past Surgical History[2]    Prescriptions Prior to Admission[3]  Current Medications and Prescriptions Ordered in Epic[4]    Allergies[5]    Family History[6]  Social Hx on file[7]    Available pre-op labs reviewed.  Lab Results   Component Value Date    WBC 6.7 04/07/2025    RBC 4.42 04/07/2025    HGB 12.7 04/07/2025    HCT 40.7 04/07/2025    MCV 92.1 04/07/2025    MCH 28.7 04/07/2025    MCHC 31.2 04/07/2025    RDW 13.1 04/07/2025    .0 04/07/2025     Lab Results   Component Value Date     05/20/2025    K 4.1 05/20/2025     05/20/2025    CO2 30.0 05/20/2025    BUN 16 05/20/2025    CREATSERUM 0.67 05/20/2025     (H) 05/20/2025    PGLU 152 (H) 06/02/2025    CA 9.4 05/20/2025          Vital Signs:  Body mass index is 29.62 kg/m².   height is 1.651 m (5' 5\") and weight is 80.7 kg (178 lb). Her oral temperature is 97.5 °F (36.4 °C). Her blood pressure is 135/69 and her pulse is 84. Her respiration is 16 and oxygen saturation is 93%.   Vitals:    05/30/25 1616 06/02/25 0615   BP:  135/69   Pulse:  84   Resp:  16   Temp:  97.5 °F (36.4 °C)   TempSrc:  Oral   SpO2:  93%   Weight: 81.6 kg (180 lb) 80.7 kg (178 lb)   Height: 1.651 m (5' 5\") 1.651 m (5' 5\")        Anesthesia  Evaluation      Airway   Mallampati: I  TM distance: >3 FB  Neck ROM: full  Dental          Pulmonary - negative ROS and normal exam   Cardiovascular - negative ROS and normal exam    Neuro/Psych - negative ROS     GI/Hepatic/Renal - negative ROS     Endo/Other    Abdominal                  Anesthesia Plan:   ASA:  2  Plan:   MAC  Plan Comments: I have discussed the anesthetic plan, major risks and alternatives with the patient and answered all questions. The patient desires to proceed with surgery and anesthesia as planned.     Informed Consent Plan and Risks Discussed With:  Patient      I have informed Perez Narvaez and/or legal guardian or family member of the nature of the anesthetic plan, benefits, risks including possible dental damage if relevant, major complications, and any alternative forms of anesthetic management.   All of the patient's questions were answered to the best of my ability. The patient desires the anesthetic management as planned.  YANNA DODD DO  2025 8:30 AM  Present on Admission:  **None**           [1]   Past Medical History:   Diabetes (HCC)    High cholesterol    Hyperlipidemia    Visual impairment    contacts   [2]   Past Surgical History:  Procedure Laterality Date          Colonoscopy N/A 2020    Procedure: COLONOSCOPY;  Surgeon: Crow Moran MD;  Location: [facility] ENDOSCOPY    Lumpectomy left Left 2025    DCIS    Other surgical history      varicose vein  surgery     Tonsillectomy     [3]   Medications Prior to Admission   Medication Sig Dispense Refill Last Dose/Taking    metFORMIN  MG Oral Tablet 24 Hr TAKE 1 TABLET BY MOUTH WITH BREAKFAST AND 2 TABLETS WITH DINNER 270 tablet 2 2025    atorvastatin (LIPITOR) 20 MG Oral Tab Take 1 tablet (20 mg total) by mouth daily. 90 tablet 3 2025   [4]   Current Facility-Administered Medications Ordered in Epic   Medication Dose Route Frequency Provider Last Rate Last Admin    lactated  ringers infusion   Intravenous Continuous Lauren Hernandez MD 20 mL/hr at 06/02/25 0624 New Bag at 06/02/25 0624    ceFAZolin (Ancef) 2g in 10mL IV syringe premix  2 g Intravenous Once Lauren Hernandez MD         No current [facility]-ordered outpatient medications on file.   [5] No Known Allergies  [6]   Family History  Problem Relation Age of Onset    Other (renal failure) Mother 86    Cancer Father 67        Bladder    Breast Cancer Sister 73   [7]   Social History  Socioeconomic History    Marital status:    Tobacco Use    Smoking status: Never    Smokeless tobacco: Never   Vaping Use    Vaping status: Never Used   Substance and Sexual Activity    Alcohol use: Yes     Comment: Social    Drug use: No

## 2025-06-02 NOTE — IMAGING NOTE
Pt  to mammography department scouts completed by RAQUEL mammography technologist     0645 Hx taken procedure explained questions answered.  Iv patent no redness or swelling noted at site     0655 Consent signed     0711 Dr MURPHY here scanning completed Order verified and signed by all  procedural staff members    0712 Time out taken       site marked LEFT  Breast    0716 Chloro prep as skin prep to site.  Lidocaine   1% 10 milligrams per ml given as anesthetic affect from kit  3 ml total given.    0717  Ghiatas needle  20 gauge  X 9 cm   placed .  Pt re  images procedure complete.    0725   BB marker to site wire secured to breast  strips.  A 4x4 dsg secured  over wire with tape by nurse KIARRA SHARPE  after images completed .    0732 TO AMB SURGERY department  after images completed.

## 2025-06-02 NOTE — DISCHARGE INSTRUCTIONS
Breast Surgery  Post-operative Instructions  Excisional Biopsy, Lumpectomy, Mastectomy, [place] Node Biopsy, or Axillary  Lymph Node Dissection  Lauren Hernandez MD  General Instructions  The following instructions will provide helpful information that will assist your recovery. These are designed to be general guidelines. Please remember that everyone heals and recovers differently. Listen to your body and rest when you are tired. If you have any questions or concerns, please do not hesitate to contact my office. I would like to see you in the office about one week after surgery, please schedule and appointment through my office to make a post-operative appointment if you do not already have one.     Restrictions  There are no lifting weight restrictions for the arm on the surgical side. You may gradually increase the amount of weight based on your comfort level. You should avoid a lot of repetitious activity with the arm until the drain is out (if one was placed) and the wound is well-healed (about two weeks).   You should not drive a car until you believe you can react to an emergency situation and you’re no longer taking narcotic pain medications.   You may shower the day after surgery. You should not bathe or swim (i.e. submerge wound) until the wound is well healed (about two weeks).  There are no dietary restrictions.    Exercise  You may begin arm exercises within a couple days. Do these 2 or 3 times per day, beginning with light exercise and gradually increase your range of motion and repetitions. This will help your arm regain full mobility. We will address your activity level again at your post-operative visit.   You will have pain medication prescribed before discharge. Take this as directed to relieve pain. It is important that you be comfortable so that you may continue your stretching exercises.   If you find the medication prescribed is too strong, try Tylenol (Acetaminophen) or  Ibuprofen.    Wound Care  You may remove the gauze dressing on the first or second postoperative day and then shower. You should leave the steri-strips in place; they will start to peel off about 10 days after your surgery. The stitches are all underneath the skin and will dissolve on their own. You will not need any stitches removed except if you have a drain in place.  I encourage you to shower once the outer bandage is removed, you may use soap and water directly over the steri-strips and pat dry following.  You should keep gauze dressing on the wound until the wound is completely dry and without drainage-usually 1-3 days.   If a surgical bra was placed after the surgery, I encourage you to wear it as much as possible during the week following the procedure (including during sleep). Alternatively, you may choose to wear your own bra provided it is comfortable, provides support and does not have an underwire. If the breast doesn’t move it is less painful.  It is normal to feel a lump in the area of the incisions for up to 6 months. This is part of the healing process. Eventually the breast will return to its normal condition.   Pain Medication  You will be given a prescription for a narcotic pain medication (usually Norco) upon discharge. Many patients have very little pain and don’t want to use the narcotic. Don’t be afraid to use it if you’re uncomfortable. If you’d prefer you may substitute Tylenol or Ibuprofen (Motrin, Advil). Using an ice pack for a few minutes over the incision can also alleviate pain. If you do use the narcotic medication, use an over the counter stool softener or gentle laxative and stay well-hydrated as constipation is not uncommon with narcotics.    Pathology Report  The Pathology report is usually available 4-5 business days following the surgery. I will call you  with the results once the report is available.    Notify my office if:   Your temperature is over 101.5 F   You notice  increasing swelling, redness, warmth or drainage from around the incision or drain site.    If you experience any problems please call my office and either my nurse or myself will respond. After hours, you will be forwarded to my answering service which will help you get in touch with myself or the physician covering for me.       HOME INSTRUCTIONS  [facility]G HOME CARE INSTRUCTIONS: POST-OP ANESTHESIA  The medication that you received for sedation or general anesthesia can last up to 24 hours. Your judgment and reflexes may be altered, even if you feel like your normal self.      We Recommend:   Do not drive any motor vehicle or bicycle   Avoid mowing the lawn, playing sports, or working with power tools/applicances (power saws, electric knives or mixers)   That you have someone stay with you on your first night home   Do not drink alcohol or take sleeping pills or tranquilizers   Do not sign legal documents within 24 hours of your procedure   If you had a nerve block for your surgery, take extra care not to put any pressure on your arm or hand for 24 hours    It is normal:  For you to have a sore throat if you had a breathing tube during surgery (while you were asleep!). The sore throat should get better within 48 hours. You can gargle with warm salt water (1/2 tsp in 4 oz warm water) or use a throat lozenge for comfort  To feel muscle aches or soreness especially in the abdomen, chest or neck. The achy feeling should go away in the next 24 hours  To feel weak, sleepy or \"wiped out\". Your should start feeling better in the next 24 hours.   To experience mild discomforts such as sore lip or tongue, headache, cramps, gas pains or a bloated feeling in your abdomen.   To experience mild back pain or soreness for a day or two if you had spinal or epidural anesthesia.   If you had laparoscopic surgery, to feel shoulder pain or discomfort on the day of surgery.   For some patients to have nausea after  surgery/anesthesia    If you feel nausea or experience vomiting:   Try to move around less.   Eat less than usual or drink only liquids until the next morning   Nausea should resolve in about 24 hours    If you have a problem when you are at home:    Call your surgeons office   Discharge Instructions: After Your Surgery  You’ve just had surgery. During surgery, you were given medicine called anesthesia to keep you relaxed and free of pain. After surgery, you may have some pain or nausea. This is common. Here are some tips for feeling better and getting well after surgery.   Going home  Your healthcare provider will show you how to take care of yourself when you go home. They'll also answer your questions. Have an adult family member or friend drive you home. For the first 24 hours after your surgery:   Don't drive or use heavy equipment.  Don't make important decisions or sign legal papers.  Take medicines as directed.  Don't drink alcohol.  Have someone stay with you, if needed. They can watch for problems and help keep you safe.  Be sure to go to all follow-up visits with your healthcare provider. And rest after your surgery for as long as your provider tells you to.   Coping with pain  If you have pain after surgery, pain medicine will help you feel better. Take it as directed, before pain becomes severe. Also, ask your healthcare provider or pharmacist about other ways to control pain. This might be with heat, ice, or relaxation. And follow any other instructions your surgeon or nurse gives you.      Stay on schedule with your medicine.     Tips for taking pain medicine  To get the best relief possible, remember these points:   Pain medicines can upset your stomach. Taking them with a little food may help.  Most pain relievers taken by mouth need at least 20 to 30 minutes to start to work.  Don't wait till your pain becomes severe before you take your medicine. Try to time your medicine so that you can take it  before starting an activity. This might be before you get dressed, go for a walk, or sit down for dinner.  Constipation is a common side effect of some pain medicines. Call your healthcare provider before taking any medicines, such as laxatives or stool softeners, to help ease constipation. Also ask if you should skip any foods. Drinking lots of fluids and eating foods, such as fruits and vegetables, that are high in fiber can also help. Remember, don't take laxatives unless your surgeon has prescribed them.  Drinking alcohol and taking pain medicine can cause dizziness and slow your breathing. It can even be deadly. Don't drink alcohol while taking pain medicine.  Pain medicine can make you react more slowly to things. Don't drive or run machinery while taking pain medicine.  Your healthcare provider may tell you to take acetaminophen to help ease your pain. Ask them how much you're supposed to take each day. Acetaminophen or other pain relievers may interact with your prescription medicines or other over-the-counter (OTC) medicines. Some prescription medicines have acetaminophen and other ingredients in them. Using both prescription and OTC acetaminophen for pain can cause you to accidentally overdose. Read the labels on your OTC medicines with care. This will help you to clearly know the list of ingredients, how much to take, and any warnings. It may also help you not take too much acetaminophen. If you have questions or don't understand the information, ask your pharmacist or healthcare provider to explain it to you before you take the OTC medicine.   Managing nausea  Some people have an upset stomach (nausea) after surgery. This is often because of anesthesia, pain, or pain medicine, less movement of food in the stomach, or the stress of surgery. These tips will help you handle nausea and eat healthy foods as you get better. If you were on a special food plan before surgery, ask your healthcare provider if you  should follow it while you get better. Check with your provider on how your eating should progress. It may depend on the surgery you had. These general tips may help:   Don't push yourself to eat. Your body will tell you when to eat and how much.  Start off with clear liquids and soup. They're easier to digest.  Next try semi-solid foods as you feel ready. These include mashed potatoes, applesauce, and gelatin.  Slowly move to solid foods. Don’t eat fatty, rich, or spicy foods at first.  Don't force yourself to have 3 large meals a day. Instead eat smaller amounts more often.  Take pain medicines with a small amount of solid food, such as crackers or toast. This helps prevent nausea.  When to call your healthcare provider  Call your healthcare provider right away if you have any of these:   You still have too much pain, or the pain gets worse, after taking the medicine. The medicine may not be strong enough. Or there may be a complication from the surgery.  You feel too sleepy, dizzy, or groggy. The medicine may be too strong.  Side effects, such as nausea or vomiting. Your healthcare provider may advise taking other medicines to treat these or may change your treatment plan..  Skin changes, such as rash, itching, or hives. This may mean you have an allergic reaction. Your provider may advise taking other medicines.  The incision looks different (for instance, part of it opens up).  Bleeding or fluid leaking from the incision site, and you weren't told to expect that.  Fever of 100.4°F (38°C) or higher, or as directed by your healthcare provider.  Call 911  Call 911 right away if you have:   Trouble breathing  Facial swelling    If you have obstructive sleep apnea   You were given anesthesia medicine during surgery to keep you comfortable and free of pain. After surgery, you may have more apnea spells because of this medicine and other medicines you were given. The spells may last longer than normal.    At  home:  Keep using the continuous positive airway pressure (CPAP) device when you sleep. Unless your healthcare provider tells you not to, use it when you sleep, day or night. CPAP is a common device used to treat obstructive sleep apnea.  Talk with your provider before taking any pain medicine, muscle relaxants, or sedatives. Your provider will tell you about the possible dangers of taking these medicines.  Contact your provider if your sleeping changes a lot even when taking medicines as directed.  [employer] last reviewed this educational content on 4/1/2024  This information is for informational purposes only. This is not intended to be a substitute for professional medical advice, diagnosis, or treatment. Always seek the advice and follow the directions from your physician or other qualified health care provider.  © 1322-2996 [employer]. All rights reserved. This information is not intended as a substitute for professional medical care. Always follow your healthcare professional's instructions.

## 2025-06-02 NOTE — OPERATIVE REPORT
[facility]    PATIENT'S NAME: JASBIR RAY   ATTENDING PHYSICIAN: Lauren Hernandez MD   OPERATING PHYSICIAN: Lauren Hernandez MD   PATIENT ACCOUNT#:   [id number]    LOCATION:  [facility] PACU 6 [facility]  MEDICAL RECORD #:   [id number]       YOB: 1960  ADMISSION DATE:       06/02/2025      OPERATION DATE:  06/02/2025    OPERATIVE REPORT      PREOPERATIVE DIAGNOSIS:  Ductal carcinoma in situ of the left breast.  POSTOPERATIVE DIAGNOSIS:  Ductal carcinoma in situ of the left breast.  PROCEDURE:  Left breast wire-localized lumpectomy with left breast specimen radiogram and left breast advancement flap mastopexy for a defect measuring 12 sq cm.    ASSISTANT:  FELIPE Costello.    ESTIMATED BLOOD LOSS:  5 mL.    DRAINS:  None.    COMPLICATIONS:  None.    DISPOSITION:  Stable on transfer to recovery room.    INDICATIONS:  The patient is a 65-year-old female who underwent imaging and was found to have an asymmetry in the left breast that furthermore was looked at with an MRI, confirming suspicious enhancement.  She had a biopsy-confirmed ductal carcinoma in situ.  We discussed local treatment options.  She was motivated for a breast-conserving approach.  We discussed the need to achieve free margins, the possibility of re-excision to achieve free margins, as well as possible need for postoperative further risk-reducing therapy to minimize risk of recurrence.  Risks and possible complications were discussed with the patient, including, but not limited to, infection, bleeding, injury to surrounding structures, possible need for reoperation, and she agreed to the proposed surgery.    OPERATIVE TECHNIQUE:  Patient was brought to the imaging suite where she underwent a wire localization of the area of concern in the left breast.  She was then brought to the OR, placed in supine position, properly padded and secured, given a dose of IV antibiotics, and sequential compression devices were applied  to the legs for DVT prophylaxis.  Monitored anesthesia care was induced.  The left breast was prepped and draped in the usual sterile fashion.  Lidocaine 1% with epinephrine was used to infiltrate the targeted incision site.  A curvilinear incision was made along the superior areolar border with a 15 blade knife in the skin.  Her wire was identified, brought into the field, and a segment of breast tissue surrounding the tip of the wire was carefully excised and oriented with a short stitch and single clip superiorly, a long stitch and double clip laterally in order to allow for appropriate pathological margin and specimen review.  This was then placed in the imaging device where a specimen x-ray confirmed the presence of targeted clip in the area with adequate margins as deemed by myself.  Using sharp dissection and electrocautery, 6 margins were then taken from the interior of the lumpectomy cavity.  Each were marked with a clip at true margin, individually labeled, and sent for routine permanent pathologic evaluation.  Wound was then irrigated with warm sterile saline.  Hemostasis was assured with electrocautery.  Hemoclips were placed around the periphery of the cavity to assist with subsequent surveillance.  She was found to have a large defect in the superior central breast measuring at least 12 sq cm, thought to impair both optimal wound healing and cosmesis, for which we proceeded with an advancement flap mastopexy.  This required I place a counterincision through the immediate adjacent superior lateral breast parenchyma down to the level of the pectoralis fascia.  I then used a superior vascular pedicle, mobilized this into the aforementioned defect, and secured it at the level of the pectoralis fascia with a running 3-0 PDS suture.  The wound was then closed with an interrupted 3-0 Vicryl for deep layer and a running 4-0 subcuticular Monocryl for skin.  Mastisol and Steri-Strips were applied.  Marcaine 0.5%  was instilled in the cavity to assist with postoperative analgesia.  A sterile dressing and compression bra were placed.  Her blood loss was minimal.  All counts were correct at the conclusion of the procedure.  She tolerated the procedure well and was transferred to Recovery in stable condition.    Dictated By Lauren Hernandez MD  d: 06/02/2025 09:33:36  t: 06/02/2025 10:33:15  [facility] [id number]  [facility]/    cc: DO Parveen Hyatt MD

## 2025-06-05 ENCOUNTER — TELEPHONE (OUTPATIENT)
Age: 65
End: 2025-06-05

## 2025-06-05 NOTE — TELEPHONE ENCOUNTER
Patient is s/p left breast wire localized lumpectomy, performed by Dr. Hernandez at [facility] on 6/2/25.  Patient reports feeling well.  Reviewed with patient message from Dr. Hernandez regarding her surgical pathology.  Discussed scheduling consultation with Medical Oncology.  Patient agreeable to appointment on 6/11/25 at 1pm with Dr. Juárez.  All appointment information provided to patient.  Encouraged patient call with any questions or concerns.

## 2025-06-11 ENCOUNTER — OFFICE VISIT (OUTPATIENT)
Facility: CLINIC | Age: 65
End: 2025-06-11
Payer: MEDICARE

## 2025-06-11 ENCOUNTER — OFFICE VISIT (OUTPATIENT)
Age: 65
End: 2025-06-11
Attending: INTERNAL MEDICINE
Payer: MEDICARE

## 2025-06-11 VITALS
TEMPERATURE: 97 F | RESPIRATION RATE: 16 BRPM | HEART RATE: 78 BPM | OXYGEN SATURATION: 96 % | SYSTOLIC BLOOD PRESSURE: 127 MMHG | DIASTOLIC BLOOD PRESSURE: 77 MMHG | BODY MASS INDEX: 29.99 KG/M2 | HEIGHT: 65 IN | WEIGHT: 180 LBS

## 2025-06-11 VITALS
HEART RATE: 78 BPM | BODY MASS INDEX: 30 KG/M2 | RESPIRATION RATE: 16 BRPM | DIASTOLIC BLOOD PRESSURE: 77 MMHG | SYSTOLIC BLOOD PRESSURE: 127 MMHG | WEIGHT: 180 LBS | OXYGEN SATURATION: 96 %

## 2025-06-11 DIAGNOSIS — D05.12 BREAST NEOPLASM, TIS (DCIS), LEFT: Primary | ICD-10-CM

## 2025-06-11 PROCEDURE — 99024 POSTOP FOLLOW-UP VISIT: CPT | Performed by: SURGERY

## 2025-06-11 NOTE — CONSULTS
Perez Narvaez is a 65 year old female here at the request of Emili Lira DO for evaluation of Breast neoplasm, Tis (DCIS), left.    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    Breast neoplasm symptoms and diagnostic evaluation  - Diagnosed with ductal carcinoma in situ (DCIS) of the left breast following imaging and biopsy.  - Screening mammogram on February 27, 2025, revealed focal asymmetry in the upper outer quadrant of the left breast.  - Additional mammographic views on March 20, 2025, confirmed the asymmetry, which partially dissipated with compression and had no sonographic correlate.  - MRI on April 4, 2025, demonstrated a 2.1 x 1.2 x 1.5 cm area of enhancement at the 12 o'clock position, corresponding to the mammographic finding.  - MRI-guided biopsy on April 16, 2025, confirmed DCIS, nuclear grade 3, solid cribriform type, largest focus 4 mm, associated with necrosis and microcalcifications.    Postoperative recovery (left breast lumpectomy)  - Underwent left breast wire-localized lumpectomy on June 2, 2025.  - Pathology confirmed DCIS, nuclear high grade, solid and cribriform type with occasional comedonecrosis; largest continuous DCIS measured 15 mm.  - No microinvasion or invasive carcinoma identified.  - Postoperative symptoms include minimal swelling and bruising.  - Required prescribed pain medication only on the first postoperative day.  - Has resumed normal activities and feels well.    Constitutional and systemic symptoms  - No changes in appetite or weight loss.  - No fatigue, cough, shortness of breath, chest pain, abdominal pain, urinary symptoms, headaches, dizziness, or swollen lymph nodes.    Vasomotor symptoms  - Occasional nocturnal hot flashes, attributed to menopausal status.    Musculoskeletal symptoms  - Occasional right knee pain, not  limiting activities.    Venous insufficiency  - History of varicose veins, currently receiving treatment.  - No history of venous thromboembolism.    Sleep disturbance  - Chronic poor sleep with difficulty falling asleep and frequent nighttime awakenings.      ECOG PS 0     Breast cancer risk factors:  Pt is a   Pt was 26 years old at time of first pregnancy.    She denies any cumulative breastfeeding history   She achieved menarche at age unknown and LMP unknown  Age of Menopause:   Type:   She denies any history of hormone replacement therapy  She has history of oral contraceptive use for 1 years, last in 1970s.  She denies infertility treatment to achieve pregnancy.  Breast Cancer-related family history includes Breast Cancer (age of onset: 73) in her sister; Cancer (age of onset: 67) in her father.  Body mass index is 29.95 kg/m².       Review of Systems:   Review of Systems - Oncology  10 point review of systems.  Pertinent for HPI.        Current Medications[1]    Allergies:   Allergies[2]    Past Medical History[3]    Past Surgical History[4]    Short Social Hx on File[5]    Family History[6]      PHYSICAL EXAM:    /77 (BP Location: Left arm, Patient Position: Sitting, Cuff Size: adult)   Pulse 78   Temp 97 °F (36.1 °C) (Tympanic)   Resp 16   Ht 1.651 m (5' 5\")   Wt 81.6 kg (180 lb)   SpO2 96%   BMI 29.95 kg/m²   Wt Readings from Last 6 Encounters:   25 81.6 kg (180 lb)   25 81.6 kg (180 lb)   25 80.7 kg (178 lb)   25 81.6 kg (180 lb)   25 83.9 kg (185 lb)   24 81.6 kg (180 lb)     Physical Exam  Physical Exam  GENERAL: Alert, cooperative, well developed, no acute distress.  HEENT: Normocephalic, normal oropharynx, moist mucous membranes.  NECK: No lymphadenopathy in head, neck, or clavicular regions. No axillary lymphadenopathy bilaterally. No inguinal lymphadenopathy bilaterally.  CHEST: Clear to auscultation bilaterally, no wheezes, rhonchi, or  crackles.  CARDIOVASCULAR: Regular heart rate and rhythm, S1 and S2 normal without murmurs.  BREAST: No masses in the right breast. No masses in the left breast, bruise on upper inner quadrant. Surgical scar around areola, upper outer quadrant of left breast.  ABDOMEN: Soft, non-tender, non-distended, without organomegaly, normal bowel sounds.  EXTREMITIES: No cyanosis or edema.  NEUROLOGICAL: Cranial nerves grossly intact, moves all extremities without gross motor or sensory deficit.        ASSESSMENT/PLAN:     1. Breast neoplasm, Tis (DCIS), left      Assessment & Plan  Ductal carcinoma in situ (DCIS) of left breast  DCIS of the left breast, stage 0, confirmed by MRI-guided biopsy showing nuclear grade 3, solid cribriform type with necrosis and microcalcifications. Largest focus measured 15 mm post-lumpectomy. No microinvasion or invasive cancer identified. High estrogen receptor positivity (95%).  - Refer to radiation oncologist for adjuvant radiation therapy to decrease local recurrence risk. Radiation therapy to include 15-20 sessions, Monday through Friday, post-surgical healing. Discuss potential side effects including local skin changes, fatigue, and treatment logistics.  - Discuss tamoxifen therapy for 5 years post-radiation to reduce risk of invasive breast cancer. Discuss potential side effects including hot flashes (50% of patients), muscle cramps (20% of patients), and rare risks such as thromboembolism (1-3% of patients) and endometrial cancer (1-2% of patients).  - Advise on management of hot flashes with options like flaxseed oil or venlafaxine if needed. Discuss the cost and insurance coverage of Veosa for severe cases.  - Instruct to avoid grapefruit products while on tamoxifen.  - Ensure follow-up with gynecologist for monitoring of uterine health, especially for postmenopausal bleeding.  - Advise annual ophthalmologic exams to monitor for cataracts.  - Schedule follow-up appointment  post-radiation to initiate tamoxifen therapy.    Recording duration: 27 minutes    Return in about 9 weeks (around 8/13/2025) for Follow-up after RT.    MDM high risk.      The following individual(s) Perez BALTAZAR Brice, verbally consented to be recorded using Ambient AI technology and understand that they can withdraw their consent to listening technology at any point by asking the clinician to turn off or pause the recording.     No orders of the defined types were placed in this encounter.      Results From Past 48 Hours:  No results found for this or any previous visit (from the past 48 hours).    Imaging & Referrals:  None   No orders of the defined types were placed in this encounter.    Results  RADIOLOGY  Screening mammogram: Category A breast densities, focal asymmetry in the upper outer quadrant of the left breast (02/27/2025)  DEXA: Normal bone mineral density (03/03/2025)  Additional mammogram views: Category B breast density, focal asymmetry in the upper outer quadrant of the left breast, partially dissipated with compression, no sonographic correlate (03/20/2025)  Breast MRI: Right breast: no suspicious findings; Left breast: 2.1 x 1.2 x 1.5 cm area of enhancement at 12 o'clock position, 6 mm intramammary lymph node at 3 o'clock position, no enlarged lymph nodes in the left axilla or internal mammary lymphadenopathy (04/04/2025)    PATHOLOGY  MRI-guided biopsy: Ductal carcinoma in situ, nuclear grade 3, solid cribriform, largest focus 4 mm, associated with necrosis and microcalcifications, ER >95% (04/16/2025)  Left breast lumpectomy: Ductal carcinoma in situ, nuclear high grade, solid and cribriform type with occasional comedonecrosis, largest continuous DCIS 15 mm, no microinvasion or invasive cancer identified (06/02/2025)               [1]   Current Outpatient Medications   Medication Sig Dispense Refill    metFORMIN  MG Oral Tablet 24 Hr TAKE 1 TABLET BY MOUTH WITH BREAKFAST AND 2 TABLETS WITH  DINNER 270 tablet 2    atorvastatin (LIPITOR) 20 MG Oral Tab Take 1 tablet (20 mg total) by mouth daily. 90 tablet 3   [2] No Known Allergies  [3]   Past Medical History:   Diabetes (HCC)    High cholesterol    Hyperlipidemia    Visual impairment    contacts   [4]   Past Surgical History:  Procedure Laterality Date          Colonoscopy N/A 2020    Procedure: COLONOSCOPY;  Surgeon: Crow Moran MD;  Location: [facility] ENDOSCOPY    Lumpectomy left Left 2025    DCIS    Other surgical history      varicose vein  surgery     Tonsillectomy     [5]   Social History  Socioeconomic History    Marital status:    Tobacco Use    Smoking status: Never    Smokeless tobacco: Never   Vaping Use    Vaping status: Never Used   Substance and Sexual Activity    Alcohol use: Yes     Comment: Social    Drug use: No   [6]   Family History  Problem Relation Age of Onset    Other (renal failure) Mother 86    Cancer Father 67        Bladder    Breast Cancer Sister 73

## 2025-06-11 NOTE — PATIENT INSTRUCTIONS
VISIT SUMMARY:  Today, we discussed the post-surgical management of your recently diagnosed ductal carcinoma in situ (DCIS) of the left breast. We reviewed your diagnostic evaluations, surgical outcomes, and the next steps in your treatment plan.    YOUR PLAN:  -DUCTAL CARCINOMA IN SITU (DCIS) OF LEFT BREAST: DCIS is a non-invasive breast cancer where abnormal cells are found in the lining of a breast duct but have not spread outside the duct. You will be referred to a radiation oncologist for adjuvant radiation therapy to reduce the risk of local recurrence. This will involve 15-20 sessions, Monday through Friday, after your surgical site has healed. We also discussed starting tamoxifen therapy for 5 years post-radiation to lower the risk of developing invasive breast cancer. Potential side effects of tamoxifen include hot flashes, muscle cramps, and rare risks such as blood clots and endometrial cancer. We talked about managing hot flashes with options like flaxseed oil or venlafaxine if needed. You should avoid grapefruit products while on tamoxifen. It's important to follow up with your gynecologist to monitor your uterine health, especially if you experience any postmenopausal bleeding. Additionally, you should have annual eye exams to check for cataracts. We will schedule a follow-up appointment after your radiation therapy to start tamoxifen.    INSTRUCTIONS:  Please schedule an appointment with a radiation oncologist for your adjuvant radiation therapy. Follow up with your gynecologist for monitoring of uterine health, especially if you experience any postmenopausal bleeding. Schedule annual eye exams to monitor for cataracts. Avoid grapefruit products while on tamoxifen. We will have a follow-up appointment after your radiation therapy to start tamoxifen therapy.    Contains text generated by Sushila

## 2025-06-12 NOTE — PROGRESS NOTES
Breast Surgery Post-Operative Visit    Diagnosis: Left breast DCIS status postlumpectomy in 2025.    Stage: [id number]    Disease Status:  Surgical treatment complete, medical and radiation oncology recommendations pending.    History:   This 65 year old woman presented with a biopsy confirmed diagnosis of left breast DCIS.  She denies any palpable masses, nipple discharge, skin changes or axillary symptoms.  She does have a family history of breast cancer.  She has no personal prior history of breast disease or biopsies.  She had a routine screening mammogram on 2025 that showed a new asymmetry in the left breast.  She had a left diagnostic evaluation on 2025 that confirmed this to partially dissipate with compression and therefore an MRI was recommended.  The MRI took place on 2025 and she was found to have an avid 2.1 x 1.2 x 1.5 cm area of enhancement at the 12 o'clock position of the left breast for which a biopsy was recommended with no suspicious axillary adenopathy.  Her biopsy took place on 2025 confirming ductal carcinoma in situ measuring up to 4 mm that was ER/CO positive.she underwent lumpectomy without complication.  She she is here today for evaluation and recommendations for further therapy.        Past Medical History[1]    Past Surgical History[2]    Gynecological History:  Pt is a   Pt was 26 years old at time of first pregnancy.    She denies any cumulative breastfeeding history   She achieved menarche at age unknown and LMP unknown  Age of Menopause:   Type:   She denies any history of hormone replacement therapy  She has history of oral contraceptive use for 1 years, last in 1970s.  She denies infertility treatment to achieve pregnancy.    Medications:    Current Medications[3]    Allergies:    Allergies[4]    Family History:   Family History[5]    She is not of Ashkenazi Rastafarian ancestry.    Social History:  History   Alcohol Use    Yes      Comment: Social       History   Smoking Status    Never   Smokeless Tobacco    Never     Ms. Perez Narvaez is  with 4 children. She has 2 siblings. She is currently Employed part-time    Review of Systems:  General:   The patient denies, fever, chills, night sweats, fatigue, generalized weakness, change in appetite or weight loss.    HEENT:     The patient denies eye irritation, cataracts, redness, glaucoma, yellowing of the eyes, change in vision, color blindness, or +wearing contacts/glasses. The patient denies hearing loss, ringing in the ears, ear drainage, earaches, nasal congestion, nose bleeds, snoring, pain in mouth/throat, hoarseness, change in voice, facial trauma.    Respiratory:  The patient denies chronic cough, phlegm, hemoptysis, pleurisy/chest pain, pneumonia, asthma, wheezing, difficulty in breathing with exertion, emphysema, chronic bronchitis, shortness of breath or abnormal sound when breathing.     Cardiovascular:  There is no history of chest pain, chest pressure/discomfort, palpitations, irregular heartbeat, fainting or near-fainting, difficulty breathing when lying flat, SOB/Coughing at night, swelling of the legs or chest pain while walking.    Breasts:  See history of present illness    Gastrointestinal:     There is no history of difficulty or pain with swallowing, reflux symptoms, vomiting, dark or bloody stools, constipation, yellowing of the skin, indigestion, nausea, change in bowel habits, diarrhea, abdominal pain or vomiting blood.     Genitourinary:  The patient denies frequent urination, needing to get up at night to urinate, urinary hesitancy or retaining urine, painful urination, urinary incontinence, decreased urine stream, blood in the urine or vaginal/penile discharge.    Skin:    The patient denies rash, itching, skin lesions, dry skin, change in skin color or change in moles.     Hematologic/Lymphatic:  The patient denies easily bruising or bleeding or persistent  swollen glands or lymph nodes.     Musculoskeletal:  The patient denies muscle aches/pain, joint pain, stiff joints, neck pain, back pain or bone pain.    Neuropsychiatric:  There is no history of migraines or severe headaches, seizure/epilepsy, speech problems, coordination problems, trembling/tremors, fainting/black outs, dizziness, memory problems, loss of sensation/numbness, problems walking, weakness, tingling or burning in hands/feet. There is no history of abusive relationship, bipolar disorder, sleep disturbance, anxiety, depression or feeling of despair.    Endocrine:    There is no history of poor/slow wound healing, weight loss/gain, fertility or hormone problems, cold intolerance, thyroid disease.     Allergic/Immunologic:  There is no history of hives, hay fever, angioedema or anaphylaxis.    /77 (BP Location: Left arm, Patient Position: Sitting, Cuff Size: adult)   Pulse 78   Resp 16   Wt 81.6 kg (180 lb)   SpO2 96%   BMI 29.95 kg/m²     Physical Exam:  The patient is an alert, oriented, well-nourished and  well-developed woman who appears her stated age. Her speech patterns and movements are normal. Her affect is appropriate.    HEENT: The head is normocephalic. The neck is supple. The thyroid is not enlarged and is without palpable masses/nodules. There are no palpable masses. The trachea is in the midline. Conjunctiva are clear, non-icteric.    Chest: The chest expands symmetrically. The lungs are clear to auscultation.    Heart: The rhythm is regular.  There are no murmurs, rubs, gallops or thrills.    Breasts:  Her breasts are symmetrical with a cup size 38D.  Right breast: The skin, nipple ,and areola appear normal. There is no skin dimpling with movement of the pectoralis. There is no nipple retraction. No nipple discharge can be elicited. The parenchyma is mildly nodular. There are no dominant masses in the breast. The axillary tail is normal.  Left breast:   The skin, nipple, and  areola appear normal. There is no skin dimpling with movement of the pectoralis. There is no nipple retraction. No nipple discharge can be elicited. The parenchyma is mildly nodular. There are no dominant masses in the breast. The axillary tail is normal.  There is a well-healed incision with no signs of infection.    Abdomen:  The abdomen is soft, flat and non tender. The liver is not enlarged. There are no palpable masses.    Lymph Nodes:  The supraclavicular, axillary and cervical regions are free of significant lymphadenopathy.    Back: There is no vertebral column tenderness.    Skin: The skin appears normal. There are no suspicious appearing rashes or lesions.    Extremities: The extremities are without deformity, cyanosis or edema.    Impression:   Ms. Perez Narvaez is a 65 year old woman presents with left breast DCIS status postlumpectomy.    Assessment and plan: The patient is healing well since the surgery with no signs of infection.  I reviewed the pathology that showed a 1.5 cm area of DCIS with negative margins for which no further surgery is needed.  She will be meeting with medical oncology today to discuss risk reducing endocrine options and I have advised she also meet with her radiation oncologist for recommendations.  Will plan however surveillance including a bilateral diagnostic evaluation in 6 months with a clinical exam to follow. She was encouraged to contact the office with any questions or concerns prior to her next scheduled appointment.        [1]   Past Medical History:   Diabetes (HCC)    High cholesterol    Hyperlipidemia    Visual impairment    contacts   [2]   Past Surgical History:  Procedure Laterality Date          Colonoscopy N/A 2020    Procedure: COLONOSCOPY;  Surgeon: Crow Moran MD;  Location: [facility] ENDOSCOPY    Lumpectomy left Left 2025    DCIS    Other surgical history      varicose vein  surgery     Tonsillectomy     [3]    metFORMIN   MG Oral Tablet 24 Hr TAKE 1 TABLET BY MOUTH WITH BREAKFAST AND 2 TABLETS WITH DINNER 270 tablet 2    atorvastatin (LIPITOR) 20 MG Oral Tab Take 1 tablet (20 mg total) by mouth daily. 90 tablet 3   [4] No Known Allergies  [5]   Family History  Problem Relation Age of Onset    Other (renal failure) Mother 86    Cancer Father 67        Bladder    Breast Cancer Sister 73

## 2025-06-19 ENCOUNTER — OFFICE VISIT (OUTPATIENT)
Dept: RADIATION ONCOLOGY | Facility: HOSPITAL | Age: 65
End: 2025-06-19
Attending: RADIOLOGY
Payer: MEDICARE

## 2025-06-19 VITALS
HEART RATE: 88 BPM | DIASTOLIC BLOOD PRESSURE: 76 MMHG | BODY MASS INDEX: 30.16 KG/M2 | WEIGHT: 181 LBS | SYSTOLIC BLOOD PRESSURE: 136 MMHG | HEIGHT: 65 IN | RESPIRATION RATE: 16 BRPM | TEMPERATURE: 98 F | OXYGEN SATURATION: 98 %

## 2025-06-19 DIAGNOSIS — D05.12 BREAST NEOPLASM, TIS (DCIS), LEFT: Primary | ICD-10-CM

## 2025-06-19 PROCEDURE — 99212 OFFICE O/P EST SF 10 MIN: CPT

## 2025-06-19 NOTE — PATIENT INSTRUCTIONS
Patient Disclaimer:  [facility] has recently implemented an AI-assisted system to help record and organize medical information, including the after-visit summary you receive.     If anything in this summary appears inaccurate, confusing, or incomplete, we encourage you to reach out to your doctor or nurse to clarify or correct the information. Your safety and understanding are our top priorities.       VISIT SUMMARY:  Today, we discussed your recent surgery for ductal carcinoma in situ (DCIS) and the next steps in your treatment plan. We reviewed the pathology report and your family history of breast cancer. We also talked about the benefits and potential side effects of radiation therapy and hormone therapy to reduce the risk of recurrence.    YOUR PLAN:  -DUCTAL CARCINOMA IN SITU (DCIS): DCIS is a condition where abnormal cells are found in the lining of a breast duct but have not spread outside the duct. To reduce the risk of these cells becoming invasive cancer, we recommend radiation therapy and hormone therapy. Radiation therapy will involve 16 treatments over 3 weeks, which may cause skin irritation and fatigue. Hormone therapy will help reduce the risk of recurrence in both breasts by blocking estrogen. We will provide topical agents to manage any skin irritation and monitor you for fatigue, offering supportive care as needed. We will also discuss and sign the consent form for radiation therapy.    INSTRUCTIONS:  Please schedule a planning session for your radiation therapy and arrange for the 16 treatments over the next 3 weeks, 5 days a week. Use the topical agents provided to manage any skin irritation, and let us know if you experience significant fatigue or other side effects. We will monitor your progress and provide supportive care as needed. Additionally, consider discussing genetic testing with your family, as your daughters are approaching the age for recommended mammograms.    - WE WILL CALL  TO SCHEDULE YOUR CT SIMULATION FOR RADIATION PLANNING.    - IF YOU HAVE ANY QUESTIONS OR CONCERNS REGARDING RADIATION THERAPY, PLEASE CALL [phone].    Contains text generated by Sushila

## 2025-06-19 NOTE — CONSULTS
[facility]    PATIENT'S NAME: JASBIR RAY   RADIATION ONCOLOGIST: Ezio Wagner MD   PATIENT ACCOUNT #: [id number] LOCATION: [facility]   MEDICAL RECORD #: [id number] YOB: 1960   CONSULTATION DATE: 06/19/2025       RADIATION ONCOLOGY CONSULTATION    REFERRING PHYSICIAN:  Kalyn Juárez MD.    DIAGNOSIS:  DCIS of the left breast, [id number], ER positive.    HISTORY OF PRESENT ILLNESS:  The patient is a 65-year-old female recently diagnosed with a DCIS of the left breast.  She had a screening mammogram on February 27, 2025 which showed a focal asymmetry in the upper outer quadrant of the left breast.  Additional views on March 20 showed no sonographic correlate.  MRI on 04/04/2025 showed a 2.1 x 1.2 x 1.5 cm area of enhancement in the 12 o'clock position corresponding to the mammographic finding, and she eventually underwent an MRI-guided biopsy on 04/16/2025.  This revealed DCIS of grade 3, solid cribriform type.  This is associated with necrosis and microcalcifications.  She then underwent a lumpectomy on 06/02/2025 under Dr. Hernandez's care.  The final pathology demonstrated ductal carcinoma in situ of high nuclear grade.  The largest continuous specimen was 15 mm.  Margins were all widely negative, and there was no evidence of any microinvasion.  The tumor was noted to be 95% ER positive.  She then met with Dr. Juárez who recommended tamoxifen to reduce her risk of recurrence as well as her likelihood of developing additional breast malignancies.  She also was referred on to Radiation Oncology for a discussion regarding adjuvant treatment.    The patient otherwise has recovered extremely well.  She denies any particular issues or complaints at this point.  She has full range of motion and no evidence of infection or other difficulties.  She denies any fevers, chills, changes in appetite, weight loss, or any bony or joint pain.    PAST MEDICAL HISTORY:  The patient has a past history of  hyperlipidemia, diabetes, and DCIS as per HPI.      PAST SURGICAL HISTORY:   in , varicose vein procedure in the past, tonsillectomy, and the aforementioned breast surgery.    MEDICATIONS:  Lipitor and metformin.    ALLERGIES:  No known drug allergies.    FAMILY HISTORY:  Sister recently diagnosed with DCIS and a father with bladder cancer.    SOCIAL HISTORY:  She is a never smoker who reports social alcohol use.  She is retired.  She denies any transportation-related difficulties.    REVIEW OF SYSTEMS:  A 14-point review of systems is performed.  Pertinent positives and negatives are as per HPI.      PHYSICAL EXAMINATION:    GENERAL:  Physical exam reveals a 65-year-old female who is pleasant, cooperative, and alert, awake, and oriented x3.  She is in no acute distress.  She has an ECOG performance score of 0 and a current pain score of 0.  VITAL SIGNS:  Blood pressure of 136/76, pulse of 88, respiratory rate of 16, and a temperature of 97.7.  Her weight is 181 pounds.  HEENT:  Pupils are equal, round, react to light with accommodation, and the extraocular movements are intact.  The oral cavity is without ulceration or lesions.  NECK:  Supple with no lymphadenopathy.  LUNGS:  Clear to auscultation bilaterally.  HEART:  Regular rate and rhythm.   Normal S1, S2 with no audible murmurs.    LYMPHATICS:  There is no supraclavicular, axillary, or inguinal lymphadenopathy.  ABDOMEN:  Soft, nontender, nondistended with normoactive bowel sounds and no hepatosplenomegaly.  EXTREMITIES:  Without clubbing, cyanosis, or edema.  NEUROLOGIC:  Cranial nerves II through XII are grossly intact.  There are no focal deficits.  BREASTS:  On examination of the left breast, the patient is healing well.  She has a curvilinear scar which has some seroma underneath, but this is nontender and fairly mild.  There is some minimal edema within the breast.  There is no evidence of infection.  The contralateral right breast is also  clinically negative.    IMPRESSION:  This is a 65-year-old female who has a history of grade 3 DCIS.  This has been completely resected with widely negative margins.  It is noted to be ER positive, and the patient will ultimately undergo tamoxifen.  She has been referred to Radiation Oncology for a discussion regarding adjuvant treatment.    RECOMMENDATIONS:  I went through the [facility] breast DCIS nomogram with the patient.  Based upon these data, the patient is likely to have a 17% 10-year recurrence with no further therapy.  This drops down to 9% at 10 years with endocrine therapy and down to 3% with a combination of endocrine therapy along with radiation.  I went through these numbers with the patient and explained her that this does not translate to survival but has to do with breast preservation and decreased likelihood for recurrence within the breast.  I told her that she would likely tolerate these treatments well, but there certainly are some side effects.    I then walked through the side effects of radiation therapy in significant detail.  Specifically, I told her that she can experience skin irritation on the treated left breast.  This includes redness, itching, peeling, dryness, and blistering.  These side effects will worsen during the course of therapy but should resolve fairly quickly after radiation is complete.  Fatigue is commonplace as well.  I would not expect any long-term or permanent side effects as a result of the radiation, but there is a possibility of some chest wall discomfort, rib pain, or a more unlikely possibility of pulmonary or cardiac problems.  We can help prevent some of these latter potential problems through the use of prone positioning during treatment.  I would not recommend utilization of a boost given the patient's pathologic findings.  I would recommend a course of 4256 cGy in 266 cGy daily fractions to the entirety of the left breast in the prone  position.  Following this long and thorough discussion of all the risks and benefits of treatment, the patient indicated that she understood all these issues and does wish to proceed with treatment as I previously dictated.    We therefore will schedule the patient for simulation soon with the intent to begin treatment shortly thereafter.     Thank you very much for allowing me the opportunity to participate in the care of this patient.  If there should be any questions regarding the radiotherapy, please feel free to contact me at any time.    Dictated By Ezio Wagner MD  d: 06/19/2025 14:22:25  t: 06/19/2025 14:38:45  [facility] [id number]  NAD/    cc: MD Lauren Penny MD Dhara S. Naik,

## 2025-06-19 NOTE — PROGRESS NOTES
Nursing Consultation Note  Patient: Perez Narvaez  YOB: 1960  Age: 65 year old  Radiation Oncologist: Dr. Ezio Wagner  Referring Physician: Dr. Mary Penny  Diagnosis: LEFT BREAST CANCER  Consult Date: 6/19/2025      Chemotherapy: N/A  Labs: Reviewed  Imaging: Reviewed  Is the patient of child-bearing age?         No  Has the patient received radiation therapy in the past? no  Does the patient have an implantable device?No   Patient has/has had:     1. Assistive Devices: N/A    2. Flu Vaccination: no-referral to ask PCP    3. Pneumonia Vaccination:  no--referral to ask PCP    Vital Signs:   Vitals:    06/19/25 1241   BP: 136/76   Pulse: 88   Resp: 16   Temp: 97.7 °F (36.5 °C)   ,   Wt Readings from Last 6 Encounters:   06/19/25 82.1 kg (181 lb)   06/11/25 81.6 kg (180 lb)   06/11/25 81.6 kg (180 lb)   06/02/25 80.7 kg (178 lb)   05/20/25 81.6 kg (180 lb)   04/23/25 83.9 kg (185 lb)       Nursing Note:      The following individual(s) verbally consented to be recorded using ambient AI listening technology and understand that they can each withdraw their consent to this listening technology at any point by asking the clinician to turn off or pause the recording:    Patient name: Perez Narvaez  Additional names:  N/A     Presented with left breast asymmetry noted in screening mammo in Feb 2025. Diagnostic work-up ordered, had biopsy on 4/16/25. Path showed DCIS gr 3, ER/WY+. Met with Dr. Hernandez, underwent lumpectomy on 6/2/25. Path showed DCIS gr 3 with clear margins. Met with Dr. Juárez, discussed Tamoxifen and referred for RT consult. Pt AOX4, denies breast pain or edema. Has full ROM to LUE.         Review of Systems   Constitutional: Negative.    HENT: Negative.     Eyes: Negative.    Respiratory: Negative.     Cardiovascular: Negative.    Gastrointestinal: Negative.    Endocrine: Negative.    Genitourinary: Negative.    Musculoskeletal: Negative.    Skin: Negative.     Allergic/Immunologic: Negative.    Neurological: Negative.    Hematological: Negative.    Psychiatric/Behavioral: Negative.            Allergies:  Allergies[1]    Current Medications[2]    Preferred Pharmacy:    [id number] [place] [place] [address] AT [facility], [phone], [phone]  [address] [place] [address]  Phone: [phone] Fax: [phone]      Past Medical History[3]    Past Surgical History[4]    Social History     Socioeconomic History    Marital status:      Spouse name: Not on file    Number of children: 4    Years of education: Not on file    Highest education level: Not on file   Occupational History    Occupation: works at [employer] part-time   Tobacco Use    Smoking status: Never    Smokeless tobacco: Never   Vaping Use    Vaping status: Never Used   Substance and Sexual Activity    Alcohol use: Yes     Comment: Social    Drug use: No    Sexual activity: Not Currently   Other Topics Concern    Not on file   Social History Narrative    , lives with      Has 2 sons, 2 daughters - 3 living close by     Social Drivers of Health     Food Insecurity: Not on file   Transportation Needs: Not on file   Housing Stability: Not on file       ECOG:  Grade 0 - Fully active, able to carry on all predisease activities without restrictions.      Education: YES  Knowledge Deficit Plan Of Care:    Problem:  Knowledge Deficit    Problems related to:    Radiation therapy    Interventions:  Instruct on purpose of radiation therapy    Expected Outcomes:  Knowledge of radiation therapy    Progress Toward Outcome:  Making progress    Pamphlets/Handouts Given to Patient:  Understanding radiation therapy      Are ADL's met?  Yes  Does patient feel safe in their environment?  Yes  Care decisions:  Patient and/or surrogate IS involved in care decisions.  Advanced directives:  Patient DOES NOT have advanced  directives.  Transportation:  Adequate transportation available for expected visits    Pain:   ;Pain Score: 0   ;    ;          [1] No Known Allergies  [2]   Current Outpatient Medications   Medication Sig Dispense Refill    metFORMIN  MG Oral Tablet 24 Hr TAKE 1 TABLET BY MOUTH WITH BREAKFAST AND 2 TABLETS WITH DINNER 270 tablet 2    atorvastatin (LIPITOR) 20 MG Oral Tab Take 1 tablet (20 mg total) by mouth daily. 90 tablet 3   [3]   Past Medical History:   Breast cancer (HCC)    Diabetes (HCC)    High cholesterol    Hyperlipidemia    Visual impairment    contacts   [4]   Past Surgical History:  Procedure Laterality Date    Anesth,knee veins surgery Bilateral           Colonoscopy N/A 2020    Procedure: COLONOSCOPY;  Surgeon: Crow Moran MD;  Location: [facility] ENDOSCOPY    Lumpectomy left Left 2025    DCIS    Other surgical history      varicose vein  surgery     Tonsillectomy

## 2025-06-25 ENCOUNTER — APPOINTMENT (OUTPATIENT)
Dept: RADIATION ONCOLOGY | Facility: HOSPITAL | Age: 65
End: 2025-06-25
Attending: RADIOLOGY
Payer: MEDICARE

## 2025-07-01 ENCOUNTER — APPOINTMENT (OUTPATIENT)
Dept: RADIATION ONCOLOGY | Facility: HOSPITAL | Age: 65
End: 2025-07-01
Attending: RADIOLOGY
Payer: MEDICARE

## 2025-07-01 PROCEDURE — 77334 RADIATION TREATMENT AID(S): CPT | Performed by: RADIOLOGY

## 2025-07-01 PROCEDURE — 77295 3-D RADIOTHERAPY PLAN: CPT | Performed by: RADIOLOGY

## 2025-07-01 PROCEDURE — 77300 RADIATION THERAPY DOSE PLAN: CPT | Performed by: RADIOLOGY

## 2025-07-08 ENCOUNTER — OFFICE VISIT (OUTPATIENT)
Dept: RADIATION ONCOLOGY | Facility: HOSPITAL | Age: 65
End: 2025-07-08
Attending: RADIOLOGY
Payer: MEDICARE

## 2025-07-08 DIAGNOSIS — D05.12 INTRADUCTAL CARCINOMA IN SITU OF LEFT BREAST: Primary | ICD-10-CM

## 2025-07-08 PROCEDURE — 77412 RADIATION TX DELIVERY LVL 3: CPT | Performed by: RADIOLOGY

## 2025-07-08 PROCEDURE — 77280 THER RAD SIMULAJ FIELD SMPL: CPT | Performed by: RADIOLOGY

## 2025-07-08 NOTE — PROGRESS NOTES
[facility] Radiation Treatment Management Note 1-5    Patient:  Perez Narvaez  Age:  65 year old  Visit Diagnosis:    1. Intraductal carcinoma in situ of left breast      Primary Rad/Onc:  Dr. Ezio Wagner    Site Delivered Dose (cGy) Prescribed Dose (cGy) Fraction #   L breast 532 4256 2/16           First treatment date:  7/8/2025  Concurrent chemotherapy:  N/A        6/11/2025     1:12 PM 6/19/2025    12:41 PM 7/9/2025     9:41 AM   Oncology Vitals   Height 5' 5\" 5' 5\"    Height 165 cm 165 cm    Weight 180 lb 181 lb    Weight 81.647 kg 82.101 kg    BSA (m2) 1.89 m2 1.9 m2    BMI 29.95 kg/m2 30.12 kg/m2    /77 136/76 134/73   Pulse 78 88 70   Resp 16 16 16   Temp 97 °F (36.1 °C) 97.7 °F (36.5 °C) 98.1 °F (36.7 °C)   SpO2 96 % 98 % 96 %   Pain Score 0 0 0        Toxicities:  Fatigue Grade 0= None  Pruritis Grade 0= None  Dry Skin absent  Dermatitis associated with radiation Grade 0= None  Hyperpigmentation absent    Nursing Note:  OTV with Dr. Kinza kaur. No issues  Discussed skin care. Samples provided.    Mary Lou SANTOS, RN    Physician Note:  Subjective:  -doing well, new start      Objective:  Vitals noted  NAD  ECOG 0      Treatment setup imaging have been reviewed:  Yes    Assessment/Plan:  -cont RT per plan  -start moisturizer bid    We discussed expected future toxicity. All questions answered.  Next OTV 1 week     Anika Echols MD  For ND

## 2025-07-09 ENCOUNTER — OFFICE VISIT (OUTPATIENT)
Dept: RADIATION ONCOLOGY | Facility: HOSPITAL | Age: 65
End: 2025-07-09
Attending: RADIOLOGY
Payer: MEDICARE

## 2025-07-09 VITALS
DIASTOLIC BLOOD PRESSURE: 73 MMHG | TEMPERATURE: 98 F | RESPIRATION RATE: 16 BRPM | OXYGEN SATURATION: 96 % | HEART RATE: 70 BPM | SYSTOLIC BLOOD PRESSURE: 134 MMHG

## 2025-07-09 DIAGNOSIS — D05.12 INTRADUCTAL CARCINOMA IN SITU OF LEFT BREAST: Primary | ICD-10-CM

## 2025-07-09 PROCEDURE — 77412 RADIATION TX DELIVERY LVL 3: CPT | Performed by: RADIOLOGY

## 2025-07-09 PROCEDURE — 77387 GUIDANCE FOR RADJ TX DLVR: CPT | Performed by: RADIOLOGY

## 2025-07-09 PROCEDURE — 77290 THER RAD SIMULAJ FIELD CPLX: CPT | Performed by: RADIOLOGY

## 2025-07-10 PROCEDURE — 77387 GUIDANCE FOR RADJ TX DLVR: CPT | Performed by: RADIOLOGY

## 2025-07-10 PROCEDURE — 77412 RADIATION TX DELIVERY LVL 3: CPT | Performed by: RADIOLOGY

## 2025-07-11 PROCEDURE — 77412 RADIATION TX DELIVERY LVL 3: CPT | Performed by: RADIOLOGY

## 2025-07-11 PROCEDURE — 77387 GUIDANCE FOR RADJ TX DLVR: CPT | Performed by: RADIOLOGY

## 2025-07-11 PROCEDURE — 77336 RADIATION PHYSICS CONSULT: CPT | Performed by: RADIOLOGY

## 2025-07-14 PROCEDURE — 77412 RADIATION TX DELIVERY LVL 3: CPT | Performed by: RADIOLOGY

## 2025-07-14 PROCEDURE — 77387 GUIDANCE FOR RADJ TX DLVR: CPT | Performed by: RADIOLOGY

## 2025-07-15 ENCOUNTER — APPOINTMENT (OUTPATIENT)
Dept: RADIATION ONCOLOGY | Facility: HOSPITAL | Age: 65
End: 2025-07-15
Payer: MEDICARE

## 2025-07-15 PROCEDURE — 77387 GUIDANCE FOR RADJ TX DLVR: CPT | Performed by: RADIOLOGY

## 2025-07-15 PROCEDURE — 77412 RADIATION TX DELIVERY LVL 3: CPT | Performed by: RADIOLOGY

## 2025-07-16 PROCEDURE — 77412 RADIATION TX DELIVERY LVL 3: CPT | Performed by: RADIOLOGY

## 2025-07-16 PROCEDURE — 77387 GUIDANCE FOR RADJ TX DLVR: CPT | Performed by: RADIOLOGY

## 2025-07-16 NOTE — PROGRESS NOTES
[facility] Radiation Treatment Management Note 6-10    Patient:  Perez Narvaez  Age:  65 year old  Visit Diagnosis:    1. Intraductal carcinoma in situ of left breast      Primary Rad/Onc:  Dr. Ezio Wagner    Site Delivered Dose (cGy) Prescribed Dose (cGy) Fraction #   L breast 2128 [id number] 8/16           First treatment date:  7/8/25  Concurrent chemotherapy:  N/A        6/19/2025    12:41 PM 7/9/2025     9:41 AM 7/17/2025     9:39 AM   Oncology Vitals   Height 5' 5\"     Height 165 cm     Weight 181 lb     Weight 82.101 kg     BSA (m2) 1.9 m2     BMI 30.12 kg/m2     /76 134/73 126/77   Pulse 88 70 70   Resp 16 16 16   Temp 97.7 °F (36.5 °C) 98.1 °F (36.7 °C) 98.2 °F (36.8 °C)   SpO2 98 % 96 % 96 %   Pain Score 0 0 0        Toxicities:  Fatigue Grade 0= None  Pruritis Grade 0= None  Dry Skin absent  Dermatitis associated with radiation Grade 0= None  Moisturizer used Cetaphil applied Number of times: 2 times daily.  Hyperpigmentation noted very mild    Nursing Note:  Doing well.   Skin intact, very mild hyperpigmentation. Using moisturizer.    Mary Lou SANTOS RN    Physician Note:  Subjective:  -doing great, no pain, using moisturizer bid      Objective:  Vitals noted  ECOG 0  NAD  Faint skin changes      Treatment setup imaging have been reviewed:  Yes    Assessment/Plan:  -cont RT and skin care    We discussed expected future toxicity. All questions answered.  Next OTV 1 week     Anika Echols MD  For ND

## 2025-07-17 ENCOUNTER — OFFICE VISIT (OUTPATIENT)
Dept: RADIATION ONCOLOGY | Facility: HOSPITAL | Age: 65
End: 2025-07-17
Attending: RADIOLOGY
Payer: MEDICARE

## 2025-07-17 VITALS
RESPIRATION RATE: 16 BRPM | SYSTOLIC BLOOD PRESSURE: 126 MMHG | HEART RATE: 70 BPM | DIASTOLIC BLOOD PRESSURE: 77 MMHG | TEMPERATURE: 98 F | OXYGEN SATURATION: 96 %

## 2025-07-17 DIAGNOSIS — D05.12 INTRADUCTAL CARCINOMA IN SITU OF LEFT BREAST: Primary | ICD-10-CM

## 2025-07-17 PROCEDURE — 77412 RADIATION TX DELIVERY LVL 3: CPT | Performed by: RADIOLOGY

## 2025-07-17 PROCEDURE — 77387 GUIDANCE FOR RADJ TX DLVR: CPT | Performed by: RADIOLOGY

## 2025-07-18 PROCEDURE — 77387 GUIDANCE FOR RADJ TX DLVR: CPT | Performed by: RADIOLOGY

## 2025-07-18 PROCEDURE — 77412 RADIATION TX DELIVERY LVL 3: CPT | Performed by: RADIOLOGY

## 2025-07-18 PROCEDURE — 77336 RADIATION PHYSICS CONSULT: CPT | Performed by: RADIOLOGY

## 2025-07-21 PROCEDURE — 77387 GUIDANCE FOR RADJ TX DLVR: CPT | Performed by: RADIOLOGY

## 2025-07-21 PROCEDURE — 77412 RADIATION TX DELIVERY LVL 3: CPT | Performed by: RADIOLOGY

## 2025-07-22 ENCOUNTER — OFFICE VISIT (OUTPATIENT)
Dept: RADIATION ONCOLOGY | Facility: HOSPITAL | Age: 65
End: 2025-07-22
Attending: RADIOLOGY
Payer: MEDICARE

## 2025-07-22 VITALS
HEART RATE: 70 BPM | TEMPERATURE: 98 F | SYSTOLIC BLOOD PRESSURE: 131 MMHG | RESPIRATION RATE: 16 BRPM | OXYGEN SATURATION: 98 % | DIASTOLIC BLOOD PRESSURE: 71 MMHG

## 2025-07-22 DIAGNOSIS — D05.12 INTRADUCTAL CARCINOMA IN SITU OF LEFT BREAST: Primary | ICD-10-CM

## 2025-07-22 PROCEDURE — 77387 GUIDANCE FOR RADJ TX DLVR: CPT | Performed by: RADIOLOGY

## 2025-07-22 PROCEDURE — 77412 RADIATION TX DELIVERY LVL 3: CPT | Performed by: RADIOLOGY

## 2025-07-22 NOTE — PATIENT INSTRUCTIONS
Follow up with Dr. Noah Wagner in 3 months.  Kourtney will call you to schedule your follow up appointment.     Please call [phone] with any radiation questions.     Continue to moisturize for the next 2 weeks with Cetaphil.

## 2025-07-23 PROCEDURE — 77412 RADIATION TX DELIVERY LVL 3: CPT | Performed by: RADIOLOGY

## 2025-07-23 PROCEDURE — 77387 GUIDANCE FOR RADJ TX DLVR: CPT | Performed by: RADIOLOGY

## 2025-07-24 PROCEDURE — 77387 GUIDANCE FOR RADJ TX DLVR: CPT | Performed by: RADIOLOGY

## 2025-07-24 PROCEDURE — 77412 RADIATION TX DELIVERY LVL 3: CPT | Performed by: RADIOLOGY

## 2025-07-25 PROCEDURE — 77412 RADIATION TX DELIVERY LVL 3: CPT | Performed by: RADIOLOGY

## 2025-07-25 PROCEDURE — 77336 RADIATION PHYSICS CONSULT: CPT | Performed by: RADIOLOGY

## 2025-07-25 PROCEDURE — 77387 GUIDANCE FOR RADJ TX DLVR: CPT | Performed by: RADIOLOGY

## 2025-07-28 PROCEDURE — 77412 RADIATION TX DELIVERY LVL 3: CPT | Performed by: RADIOLOGY

## 2025-07-28 PROCEDURE — 77387 GUIDANCE FOR RADJ TX DLVR: CPT | Performed by: RADIOLOGY

## 2025-07-29 ENCOUNTER — APPOINTMENT (OUTPATIENT)
Dept: RADIATION ONCOLOGY | Facility: HOSPITAL | Age: 65
End: 2025-07-29
Payer: MEDICARE

## 2025-07-29 PROCEDURE — 77387 GUIDANCE FOR RADJ TX DLVR: CPT | Performed by: RADIOLOGY

## 2025-07-29 PROCEDURE — 77412 RADIATION TX DELIVERY LVL 3: CPT | Performed by: RADIOLOGY

## 2025-07-31 PROCEDURE — 77336 RADIATION PHYSICS CONSULT: CPT | Performed by: RADIOLOGY

## 2025-08-13 ENCOUNTER — OFFICE VISIT (OUTPATIENT)
Facility: LOCATION | Age: 65
End: 2025-08-13
Attending: INTERNAL MEDICINE

## 2025-08-13 VITALS
TEMPERATURE: 97 F | OXYGEN SATURATION: 96 % | WEIGHT: 178.38 LBS | SYSTOLIC BLOOD PRESSURE: 127 MMHG | HEIGHT: 65 IN | HEART RATE: 84 BPM | DIASTOLIC BLOOD PRESSURE: 78 MMHG | BODY MASS INDEX: 29.72 KG/M2 | RESPIRATION RATE: 16 BRPM

## 2025-08-13 DIAGNOSIS — D05.12 BREAST NEOPLASM, TIS (DCIS), LEFT: Primary | ICD-10-CM

## 2025-08-13 RX ORDER — TAMOXIFEN CITRATE 20 MG/1
20 TABLET ORAL DAILY
Qty: 30 TABLET | Refills: 5 | Status: SHIPPED | OUTPATIENT
Start: 2025-08-13

## 2025-08-20 ENCOUNTER — OFFICE VISIT (OUTPATIENT)
Facility: LOCATION | Age: 65
End: 2025-08-20
Attending: INTERNAL MEDICINE

## 2025-08-20 ENCOUNTER — NURSE ONLY (OUTPATIENT)
Facility: LOCATION | Age: 65
End: 2025-08-20
Attending: INTERNAL MEDICINE

## 2025-08-20 DIAGNOSIS — D05.12 BREAST NEOPLASM, TIS (DCIS), LEFT: Primary | ICD-10-CM

## 2025-08-20 DIAGNOSIS — Z80.3 FAMILY HISTORY OF MALIGNANT NEOPLASM OF BREAST: ICD-10-CM

## 2025-08-29 ENCOUNTER — TELEPHONE (OUTPATIENT)
Facility: LOCATION | Age: 65
End: 2025-08-29

## (undated) DEVICE — INSULATED BLADE ELECTRODE: Brand: EDGE

## (undated) DEVICE — ANTIBACTERIAL UNDYED BRAIDED (POLYGLACTIN 910), SYNTHETIC ABSORBABLE SUTURE: Brand: COATED VICRYL

## (undated) DEVICE — SNARE ENDOSCOPIC 10MM ROUND

## (undated) DEVICE — GAMMEX® PI HYBRID SIZE 6.5, STERILE POWDER-FREE SURGICAL GLOVE, POLYISOPRENE AND NEOPRENE BLEND: Brand: GAMMEX

## (undated) DEVICE — Device: Brand: DEFENDO AIR/WATER/SUCTION AND BIOPSY VALVE

## (undated) DEVICE — SUT PDS II 3-0 18IN ABSRB UD L24MM PS-1

## (undated) DEVICE — BRA SURG ELIZ PINK M

## (undated) DEVICE — PACK,UNIVERSAL,SPLIT,II: Brand: MEDLINE

## (undated) DEVICE — WECK HORIZON MED BLUE CLIP DISP

## (undated) DEVICE — 35 ML SYRINGE REGULAR TIP: Brand: MONOJECT

## (undated) DEVICE — SOLUTION IRRIG 1000ML 0.9% NACL USP BTL

## (undated) DEVICE — ADHESIVE LIQ 2/3ML VI MASTISOL

## (undated) DEVICE — WECK HORIZON SMALL YELLOW CLIP DISP

## (undated) DEVICE — LINE MNTR ADLT SET O2 INTMD

## (undated) DEVICE — Device: Brand: CUSTOM PROCEDURE KIT

## (undated) DEVICE — SUT PERMA- 2-0 18IN FS NABSRB BLK 26MM 3/8

## (undated) DEVICE — MEDI-VAC NON-CONDUCTIVE SUCTION TUBING 6MM X 1.8M (6FT.) L: Brand: CARDINAL HEALTH

## (undated) DEVICE — SUT MCRYL 4-0 18IN PS-2 ABSRB UD 19MM 3/8 CIR

## (undated) DEVICE — REM POLYHESIVE ADULT PATIENT RETURN ELECTRODE: Brand: VALLEYLAB

## (undated) DEVICE — FORCEP RADIAL JAW 4

## (undated) DEVICE — PAD,ABDOMINAL,8"X7.5",STERILE,LF,1/PK: Brand: MEDLINE

## (undated) DEVICE — DRAPE TAPE: Brand: CONVERTORS

## (undated) DEVICE — YANKAUER,FLEXIBLE HANDLE,REGLR CAPACITY: Brand: MEDLINE INDUSTRIES, INC.

## (undated) DEVICE — MINOR GENERAL: Brand: MEDLINE INDUSTRIES, INC.

## (undated) DEVICE — TRAP 4 CPTR CHMBR N EZ INLN

## (undated) DEVICE — Device: Brand: JELCO

## (undated) DEVICE — 12 ML SYRINGE LUER-LOCK TIP: Brand: MONOJECT

## (undated) NOTE — LETTER
[facility]  [address], [place], [place]    Authorization for Surgical Operation and Procedure                               I hereby authorize                                                                             , MD, my physician and his/her assistants (if applicable), which may include medical students, residents, and/or fellows, to perform the following surgical operation/ procedure and administer such anesthesia as may be determined necessary by my physician: Operation/Procedure name (s) Left breast wire localization on Perezrochelle Narvaez   2.   I recognize that during the surgical operation/procedure, unforeseen conditions may necessitate additional or different procedures than those listed above.  I, therefore, further authorize and request that the above-named surgeon, assistants, or designees perform such procedures as are, in their judgment, necessary and desirable.    3.   My surgeon/physician has discussed prior to my surgery the potential benefits, risks and side effects of this procedure; the likelihood of achieving goals; and potential problems that might occur during recuperation.  They also discussed reasonable alternatives to the procedure, including risks, benefits, and side effects related to the alternatives and risks related to not receiving this procedure.  I have had all my questions answered and I acknowledge that no guarantee has been made as to the result that may be obtained.    4.   Should the need arise during my operation/procedure, which includes change of level of care prior to discharge, I also consent to the administration of blood and/or blood products.  Further, I understand that despite careful testing and screening of blood or blood products by collecting agencies, I may still be subject to ill effects as a result of receiving a blood transfusion and/or blood products.  The following are some, but not all, of the potential risks that can occur: fever and  allergic reactions, hemolytic reactions, transmission of diseases such as Hepatitis, AIDS and Cytomegalovirus (CMV) and fluid overload.  In the event that I wish to have an autologous transfusion of my own blood, or a directed donor transfusion, I will discuss this with my physician.  Check only if Refusing Blood or Blood Products  I understand refusal of blood or blood products as deemed necessary by my physician may have serious consequences to my condition to include possible death. I hereby assume responsibility for my refusal and release the hospital, its personnel, and my physicians from any responsibility for the consequences of my refusal.    o  Refuse   5.   I authorize the use of any specimen, organs, tissues, body parts or foreign objects that may be removed from my body during the operation/procedure for diagnosis, research or teaching purposes and their subsequent disposal by hospital authorities.  I also authorize the release of specimen test results and/or written reports to my treating physician on the hospital medical staff or other referring or consulting physicians involved in my care, at the discretion of the Pathologist or my treating physician.    6.   I consent to the photographing or videotaping of the operations or procedures to be performed, including appropriate portions of my body for medical, scientific, or educational purposes, provided my identity is not revealed by the pictures or by descriptive texts accompanying them.  If the procedure has been photographed/videotaped, the surgeon will obtain the original picture, image, videotape or CD.  The hospital will not be responsible for storage, release or maintenance of the picture, image, tape or CD.    7.   I consent to the presence of a  or observers in the operating room as deemed necessary by my physician or their designees.    8.   I recognize that in the event my procedure results in extended X-Ray/fluoroscopy  time, I may develop a skin reaction.    9. If I have a Do Not Attempt Resuscitation (DNAR) order in place, that status will be suspended while in the operating room, procedural suite, and during the recovery period unless otherwise explicitly stated by me (or a person authorized to consent on my behalf). The surgeon or my attending physician will determine when the applicable recovery period ends for purposes of reinstating the DNAR order.  10. Patients having a sterilization procedure: I understand that if the procedure is successful the results will be permanent and it will therefore be impossible for me to inseminate, conceive, or bear children.  I also understand that the procedure is intended to result in sterility, although the result has not been guaranteed.   11. I acknowledge that my physician has explained sedation/analgesia administration to me including the risk and benefits I consent to the administration of sedation/analgesia as may be necessary or desirable in the judgment of my physician.    I CERTIFY THAT I HAVE READ AND FULLY UNDERSTAND THE ABOVE CONSENT TO OPERATION and/or OTHER PROCEDURE.     ____________________________________  _________________________________        ______________________________  Signature of Patient    Signature of Responsible Person                Printed Name of Responsible Person                                      ____________________________________  _____________________________                ________________________________  Signature of Witness        Date  Time         Relationship to Patient    STATEMENT OF PHYSICIAN My signature below affirms that prior to the time of the procedure; I have explained to the patient and/or his/her legal representative, the risks and benefits involved in the proposed treatment and any reasonable alternative to the proposed treatment. I have also explained the risks and benefits involved in refusal of the proposed treatment and  alternatives to the proposed treatment and have answered the patient's questions. If I have a significant financial interest in a co-management agreement or a significant financial interest in any product or implant, or other significant relationship used in this procedure/surgery, I have disclosed this and had a discussion with my patient.     _____________________________________________________              _____________________________  (Signature of Physician)                                                                                         (Date)                                   (Time)  Patient Name: Perez Narvaez      : 1/3/1960      Printed: 2025     Medical Record #: [id number]                                      Page 1 of 1

## (undated) NOTE — LETTER
[facility]  [address], [place], [place]    Authorization for Surgical Operation and Procedure                               I hereby authorize Lauren Hernandez MD, my physician and his/her assistants (if applicable), which may include medical students, residents, and/or fellows, to perform the following surgical operation/ procedure and administer such anesthesia as may be determined necessary by my physician: Operation/Procedure name (s) Left breast lumpectomy on Perez Narvaez   2.   I recognize that during the surgical operation/procedure, unforeseen conditions may necessitate additional or different procedures than those listed above.  I, therefore, further authorize and request that the above-named surgeon, assistants, or designees perform such procedures as are, in their judgment, necessary and desirable.    3.   My surgeon/physician has discussed prior to my surgery the potential benefits, risks and side effects of this procedure; the likelihood of achieving goals; and potential problems that might occur during recuperation.  They also discussed reasonable alternatives to the procedure, including risks, benefits, and side effects related to the alternatives and risks related to not receiving this procedure.  I have had all my questions answered and I acknowledge that no guarantee has been made as to the result that may be obtained.    4.   Should the need arise during my operation/procedure, which includes change of level of care prior to discharge, I also consent to the administration of blood and/or blood products.  Further, I understand that despite careful testing and screening of blood or blood products by collecting agencies, I may still be subject to ill effects as a result of receiving a blood transfusion and/or blood products.  The following are some, but not all, of the potential risks that can occur: fever and allergic reactions, hemolytic reactions, transmission of diseases  such as Hepatitis, AIDS and Cytomegalovirus (CMV) and fluid overload.  In the event that I wish to have an autologous transfusion of my own blood, or a directed donor transfusion, I will discuss this with my physician.  Check only if Refusing Blood or Blood Products  I understand refusal of blood or blood products as deemed necessary by my physician may have serious consequences to my condition to include possible death. I hereby assume responsibility for my refusal and release the hospital, its personnel, and my physicians from any responsibility for the consequences of my refusal.    o  Refuse   5.   I authorize the use of any specimen, organs, tissues, body parts or foreign objects that may be removed from my body during the operation/procedure for diagnosis, research or teaching purposes and their subsequent disposal by hospital authorities.  I also authorize the release of specimen test results and/or written reports to my treating physician on the hospital medical staff or other referring or consulting physicians involved in my care, at the discretion of the Pathologist or my treating physician.    6.   I consent to the photographing or videotaping of the operations or procedures to be performed, including appropriate portions of my body for medical, scientific, or educational purposes, provided my identity is not revealed by the pictures or by descriptive texts accompanying them.  If the procedure has been photographed/videotaped, the surgeon will obtain the original picture, image, videotape or CD.  The hospital will not be responsible for storage, release or maintenance of the picture, image, tape or CD.    7.   I consent to the presence of a  or observers in the operating room as deemed necessary by my physician or their designees.    8.   I recognize that in the event my procedure results in extended X-Ray/fluoroscopy time, I may develop a skin reaction.    9. If I have a Do Not Attempt  Resuscitation (DNAR) order in place, that status will be suspended while in the operating room, procedural suite, and during the recovery period unless otherwise explicitly stated by me (or a person authorized to consent on my behalf). The surgeon or my attending physician will determine when the applicable recovery period ends for purposes of reinstating the DNAR order.  10. Patients having a sterilization procedure: I understand that if the procedure is successful the results will be permanent and it will therefore be impossible for me to inseminate, conceive, or bear children.  I also understand that the procedure is intended to result in sterility, although the result has not been guaranteed.   11. I acknowledge that my physician has explained sedation/analgesia administration to me including the risk and benefits I consent to the administration of sedation/analgesia as may be necessary or desirable in the judgment of my physician.    I CERTIFY THAT I HAVE READ AND FULLY UNDERSTAND THE ABOVE CONSENT TO OPERATION and/or OTHER PROCEDURE.     ____________________________________  _________________________________        ______________________________  Signature of Patient    Signature of Responsible Person                Printed Name of Responsible Person                                      ____________________________________  _____________________________                ________________________________  Signature of Witness        Date  Time         Relationship to Patient    STATEMENT OF PHYSICIAN My signature below affirms that prior to the time of the procedure; I have explained to the patient and/or his/her legal representative, the risks and benefits involved in the proposed treatment and any reasonable alternative to the proposed treatment. I have also explained the risks and benefits involved in refusal of the proposed treatment and alternatives to the proposed treatment and have answered the patient's  questions. If I have a significant financial interest in a co-management agreement or a significant financial interest in any product or implant, or other significant relationship used in this procedure/surgery, I have disclosed this and had a discussion with my patient.     _____________________________________________________              _____________________________  (Signature of Physician)                                                                                         (Date)                                   (Time)  Patient Name: Perez Narvaez      : 1/3/1960      Printed: 2025     Medical Record #: [id number]                                      Page 1 of 1

## (undated) NOTE — LETTER
Patient Name: Perez Narvaez        : 1/3/1960       Medical Record #: [id number]    CONSENT FOR PROCEDURES/SEDATION    Date: 2025       Time: 7:12 AM        1. I authorize the performance upon Perez Narvaez the following:    ___________image guided left breast biopsy with clip placement _______________    2. I authorize Dr. Romero (and whomever is designated as the doctor’s assistant), to perform the above mentioned procedures.    3. If any unforeseen conditions arise during this procedure calling for additional procedures, operations, or medications (including anesthesia and blood transfusion), I  further request and authorize the doctor to do whatever he/she deems advisable in my interest.    4. I consent to the taking and reproduction of any photographs in the course of this procedure for professional purposes.    5. I consent to the administration of such sedation as may be considered necessary or advisable by the physician responsible for this service, with the exception of  _____________________________.    6. I have been informed by my doctor of the nature and purpose of this procedure/sedation, possible alternative methods of treatment, risk involved and possible complications.      Signature of Patient:  ___________________________    Signature of person authorized to consent for patient: Relationship to patient:  ___________________________    ___________________    Witness: ____________________     Date: ______________    Provider: ____________________     Date: ______________